# Patient Record
Sex: MALE | Race: WHITE | NOT HISPANIC OR LATINO | Employment: OTHER
[De-identification: names, ages, dates, MRNs, and addresses within clinical notes are randomized per-mention and may not be internally consistent; named-entity substitution may affect disease eponyms.]

---

## 2018-05-14 ENCOUNTER — TRANSCRIBE ORDERS (OUTPATIENT)
Dept: SCHEDULING | Age: 68
End: 2018-05-14

## 2018-05-14 DIAGNOSIS — E11.9 DIABETES MELLITUS WITHOUT COMPLICATION (CMS/HCC): Primary | ICD-10-CM

## 2018-05-16 ENCOUNTER — OFFICE VISIT (OUTPATIENT)
Dept: NUTRITION | Facility: HOSPITAL | Age: 68
End: 2018-05-16
Attending: FAMILY MEDICINE
Payer: MEDICARE

## 2018-05-16 DIAGNOSIS — E11.9 DIABETES MELLITUS WITHOUT COMPLICATION (CMS/HCC): ICD-10-CM

## 2018-05-16 PROCEDURE — G0108 DIAB MANAGE TRN  PER INDIV: HCPCS

## 2018-05-16 RX ORDER — LOSARTAN POTASSIUM 50 MG/1
TABLET ORAL DAILY
COMMUNITY

## 2018-05-16 RX ORDER — METFORMIN HYDROCHLORIDE 500 MG/1
500 TABLET ORAL 2 TIMES DAILY WITH MEALS
COMMUNITY
End: 2023-11-06

## 2018-05-16 RX ORDER — SIMVASTATIN 40 MG/1
40 TABLET, FILM COATED ORAL NIGHTLY
COMMUNITY

## 2018-05-16 NOTE — PATIENT INSTRUCTIONS
Vernon Crawley Jr  5/16/2018       Self Blood Glucose [place]:  Meter: accucheck guide      Target Range:  mg/dl - Before meal and less than 150 mg/dl - 2-hr. after meal    Recommended testing schedule:  Once a day - vary times     1.  Physical Activity:  Time: walk Time & frequency: 2 miles after lunch 4-6 times/week    ** Notify physician if you experience sever shortness of breath, dizziness, lightheadedness or chest pain during exercise.  Call 911 if rest does not relieve symptoms within 5 minutes.    2.  Healthy Eating: Reduce portion sizes, 3 meals, 1-2 snacks, Do not skip meal or carbohydrates, No more than 5 hrs. between meals or snacks and Always include protein or good fat when eating carbohydrates    3.  Goals / Recommendations:  Call physician for prescription for test strips and lancets    4.  Complete:  Diabetes Skills & Management Training Sessions in  Call or email me to schedule!        Angelica Landeros RN

## 2018-05-16 NOTE — PROGRESS NOTES
Diabetes Management Program    Subjective      Patient ID: Mr. Vernon Crawley Jr is a 68 y.o. male who has been referred for diabetes assessment.      Diabetes Medication/Instruction:   Current Outpatient Prescriptions:   •  losartan (COZAAR) 50 mg tablet, Take 50 mg by mouth daily., Disp: , Rfl:   •  metFORMIN (GLUCOPHAGE) 500 mg tablet, Take 500 mg by mouth 2 (two) times a day with meals., Disp: , Rfl:   •  simvastatin (ZOCOR) 40 mg tablet, Take 40 mg by mouth nightly., Disp: , Rfl:       SBGM:  Meter accucheck guide  instructed    Schedule:  7 days per week     1 Tests per day before meals only  Comments: BG in office at 2:45 PM (2 hrs after lunch) was 156 mg/dl.  Encouraged to monitor at different times of day.  Good return demo of meter    Physicial Activity: Discussed benefits of exercise and various options  Comments: agreed to start walking again for 2 miles 4 times/week    Meal Planning: Hx of skipping meals, Instructed basics of carb counting, basic portions, meal spacing, Label reading for carbs and Given suggested distribution for meals and snacks.     Plan: Will call back to schedule classes  Goals     • Follow your diet plan as advised by your nutritionist     • Increase physical activity      • Record your blood sugar as directed          Barriers:  No identified barriers    Plans to overcome barriers: Family involvement and Written materials    Teaching directed to:patient and wife    Maggie Landeros, RN-BC, MSN, CDE

## 2018-05-16 NOTE — LETTER
May 16, 2018     Chula Riggs MD  [address]  [place] [facility] [place] [address]    Patient: Vernon Crawley Jr   YOB: 1950   Date of Visit: 5/16/2018       Dear Dr. Riggs:    Thank you for referring Vernon Crawley Jr to me for evaluation. Below are my notes for this consultation.    If you have questions, please do not hesitate to call me. I look forward to following your patient along with you.         Sincerely,         DIABETES NURSE        CC: No Recipients  Diabetes Management Program    Subjective      Patient ID: Mr. Vernon Crawley Jr is a 68 y.o. male who has been referred for diabetes assessment.      Diabetes Medication/Instruction:   Current Outpatient Prescriptions:   •  losartan (COZAAR) 50 mg tablet, Take 50 mg by mouth daily., Disp: , Rfl:   •  metFORMIN (GLUCOPHAGE) 500 mg tablet, Take 500 mg by mouth 2 (two) times a day with meals., Disp: , Rfl:   •  simvastatin (ZOCOR) 40 mg tablet, Take 40 mg by mouth nightly., Disp: , Rfl:       SBGM:  Meter accucheck guide  instructed    Schedule:  7 days per week     1 Tests per day before meals only  Comments: BG in office at 2:45 PM (2 hrs after lunch) was 156 mg/dl.  Encouraged to monitor at different times of day.  Good return demo of meter    Physicial Activity: Discussed benefits of exercise and various options  Comments: agreed to start walking again for 2 miles 4 times/week    Meal Planning: Hx of skipping meals, Instructed basics of carb counting, basic portions, meal spacing, Label reading for carbs and Given suggested distribution for meals and snacks.     Plan: Will call back to schedule classes  Goals     • Follow your diet plan as advised by your nutritionist     • Increase physical activity      • Record your blood sugar as directed          Barriers:  No identified barriers    Plans to overcome barriers: Family involvement and Written materials    Teaching directed to:patient and wife    Maggie Landeros, RN-BC, MSN,  CDE

## 2018-06-06 ENCOUNTER — OFFICE VISIT (OUTPATIENT)
Dept: NUTRITION | Facility: HOSPITAL | Age: 68
End: 2018-06-06
Attending: FAMILY MEDICINE
Payer: MEDICARE

## 2018-06-06 DIAGNOSIS — Z71.89 ENCOUNTER FOR DIABETES EDUCATION: ICD-10-CM

## 2018-06-06 DIAGNOSIS — E11.9 TYPE 2 DIABETES MELLITUS WITHOUT COMPLICATION, WITHOUT LONG-TERM CURRENT USE OF INSULIN (CMS/HCC): Primary | ICD-10-CM

## 2018-06-06 PROCEDURE — G0109 DIAB MANAGE TRN IND/GROUP: HCPCS

## 2018-06-06 NOTE — PROGRESS NOTES
No group context. This SmartLink only works when in group documentation mode.    Subjective      Patient ID: Vernon Crawley Jr is a 68 y.o. male who has been referred for diabetes assessment.    Mr. Misbah Zurita has attended this group education session.  Class 1 of 3.    Angelica Landeros RN

## 2018-06-06 NOTE — LETTER
June 7, 2018     Chula Riggs MD  [address]  [place] [facility] [place] [address]    Patient: Vernon Crawley Jr   YOB: 1950   Date of Visit: 6/6/2018       Dear Dr. Riggs:    Thank you for referring Vernon Crawley Jr to me for evaluation. Below are my notes for this consultation.    If you have questions, please do not hesitate to call me. I look forward to following your patient along with you.         Sincerely,        [facility] DIABETES EDUCATION        CC: No Recipients  No group context. This SmartLink only works when in group documentation mode.    Subjective      Patient ID: Vernon Crawley Jr is a 68 y.o. male who has been referred for diabetes assessment.    Mr. Misbah Zurita has attended this group education session.  Class 1 of 3.    Angelica Landeros RN

## 2018-06-06 NOTE — LETTER
June 6, 2018     Carol Worley, DO  [address]  [address]  [facility] [address]    Patient: Vernon Crawley Jr   YOB: 1950   Date of Visit: 6/6/2018       Dear Dr. Worley:    Thank you for referring Vernon Crawley Jr to me for evaluation. Below are my notes for this consultation.    If you have questions, please do not hesitate to call me. I look forward to following your patient along with you.         Sincerely,        [facility] DIABETES EDUCATION        CC: No Recipients  No group context. This SmartLink only works when in group documentation mode.    Subjective      Patient ID: Vernon Crawley Jr is a 68 y.o. male who has been referred for diabetes assessment.    Mr. Misbah Zurita has attended this group education session.  Class 1 of 3.    Angelica Landeros RN

## 2018-06-13 ENCOUNTER — OFFICE VISIT (OUTPATIENT)
Dept: NUTRITION | Facility: HOSPITAL | Age: 68
End: 2018-06-13
Attending: FAMILY MEDICINE
Payer: MEDICARE

## 2018-06-13 DIAGNOSIS — Z71.89 ENCOUNTER FOR DIABETES EDUCATION: ICD-10-CM

## 2018-06-13 DIAGNOSIS — E11.9 TYPE 2 DIABETES MELLITUS WITHOUT COMPLICATION, WITHOUT LONG-TERM CURRENT USE OF INSULIN (CMS/HCC): Primary | ICD-10-CM

## 2018-06-13 PROCEDURE — G0109 DIAB MANAGE TRN IND/GROUP: HCPCS

## 2018-06-13 NOTE — PROGRESS NOTES
No group context. This SmartLink only works when in group documentation mode.    Subjective      Patient ID: Vernon Crawley Jr is a 68 y.o. male who has been referred for diabetes assessment.    Mr. Misbah Zurita has attended this group education session.  Class 2 of 3.    Angelica Landeros RN

## 2018-06-13 NOTE — LETTER
June 13, 2018     Chula Riggs MD  [address]  [place] [facility] [place] [address]    Patient: Vernon Crawley Jr   YOB: 1950   Date of Visit: 6/13/2018       Dear Dr. Riggs:    Thank you for referring Vernon Crawley Jr to me for evaluation. Below are my notes for this consultation.    If you have questions, please do not hesitate to call me. I look forward to following your patient along with you.         Sincerely,        [facility] DIABETES EDUCATION        CC: No Recipients  No group context. This SmartLink only works when in group documentation mode.    Subjective      Patient ID: Vernon Crawley Jr is a 68 y.o. male who has been referred for diabetes assessment.    Mr. Misbah Zurita has attended this group education session.  Class 2 of 3.    Angelica Landeros RN

## 2018-06-20 ENCOUNTER — OFFICE VISIT (OUTPATIENT)
Dept: NUTRITION | Facility: HOSPITAL | Age: 68
End: 2018-06-20
Attending: INTERNAL MEDICINE
Payer: MEDICARE

## 2018-06-20 DIAGNOSIS — E11.9 TYPE 2 DIABETES MELLITUS WITHOUT COMPLICATION, WITHOUT LONG-TERM CURRENT USE OF INSULIN (CMS/HCC): Primary | ICD-10-CM

## 2018-06-20 PROCEDURE — 97804 MEDICAL NUTRITION GROUP: CPT | Performed by: DIETITIAN, REGISTERED

## 2018-06-20 PROCEDURE — G0109 DIAB MANAGE TRN IND/GROUP: HCPCS | Performed by: DIETITIAN, REGISTERED

## 2018-06-20 NOTE — PROGRESS NOTES
NUTRITION PROGRESS NOTE      Vernon attended the Nutrition education class of the Diabetes Skills and Management class at [facility] on 6/20/2018.  Vernon was given instructions to set a follow-up individual appointment for nutrition within the next 4-8 weeks.

## 2018-06-20 NOTE — LETTER
June 20, 2018     Carol Worley,   [address]  [address]  [facility] [address]    Patient: Vernon Crawlye Jr   YOB: 1950   Date of Visit: 6/20/2018       Dear Dr. Worley:    Thank you for referring Vernon Crawley Jr to me for evaluation. Below are my notes for this consultation.    If you have questions, please do not hesitate to call me. I look forward to following your patient along with you.         Sincerely,        [facility] DIABETES EDUCATION        CC: No Recipients  NUTRITION PROGRESS NOTE      Vernon attended the Nutrition education class of the Diabetes Skills and Management class at [facility] on 6/20/2018.  Vernon was given instructions to set a follow-up individual appointment for nutrition within the next 4-8 weeks.

## 2018-07-04 ENCOUNTER — HOSPITAL ENCOUNTER (INPATIENT)
Facility: HOSPITAL | Age: 68
LOS: 2 days | Discharge: HOME | DRG: 917 | End: 2018-07-06
Attending: EMERGENCY MEDICINE | Admitting: HOSPITALIST
Payer: MEDICARE

## 2018-07-04 ENCOUNTER — APPOINTMENT (EMERGENCY)
Dept: RADIOLOGY | Facility: HOSPITAL | Age: 68
DRG: 917 | End: 2018-07-04
Attending: EMERGENCY MEDICINE
Payer: MEDICARE

## 2018-07-04 ENCOUNTER — APPOINTMENT (INPATIENT)
Dept: RADIOLOGY | Facility: HOSPITAL | Age: 68
DRG: 917 | End: 2018-07-04
Attending: INTERNAL MEDICINE
Payer: MEDICARE

## 2018-07-04 DIAGNOSIS — J96.00 ACUTE RESPIRATORY FAILURE, UNSPECIFIED WHETHER WITH HYPOXIA OR HYPERCAPNIA: ICD-10-CM

## 2018-07-04 DIAGNOSIS — T78.40XA ALLERGIC REACTION, INITIAL ENCOUNTER: Primary | ICD-10-CM

## 2018-07-04 PROBLEM — T63.441A ANAPHYLACTIC REACTION TO BEE STING: Status: ACTIVE | Noted: 2018-07-04

## 2018-07-04 PROBLEM — N28.9 ACUTE RENAL INSUFFICIENCY: Status: ACTIVE | Noted: 2018-07-04

## 2018-07-04 PROBLEM — E78.49 OTHER HYPERLIPIDEMIA: Status: ACTIVE | Noted: 2018-07-04

## 2018-07-04 PROBLEM — I10 ESSENTIAL HYPERTENSION: Status: ACTIVE | Noted: 2018-07-04

## 2018-07-04 PROBLEM — T78.2XXA ANAPHYLACTIC REACTION TO BEE STING: Status: ACTIVE | Noted: 2018-07-04

## 2018-07-04 LAB
ALBUMIN SERPL-MCNC: 3.7 G/DL (ref 3.4–5)
ALP SERPL-CCNC: 57 IU/L (ref 35–126)
ALT SERPL-CCNC: 15 IU/L (ref 16–63)
ANION GAP SERPL CALC-SCNC: 8 MEQ/L (ref 3–15)
APTT PPP: 24 SEC. (ref 23–35)
AST SERPL-CCNC: 23 IU/L (ref 15–41)
BACTERIA URNS QL MICRO: 1 /UL
BASE EXCESS BLDA CALC-SCNC: -3.9 MMOL/L
BASE EXCESS BLDA CALC-SCNC: -8.5 MMOL/L
BASOPHILS # BLD: 0 K/UL (ref 0.01–0.1)
BASOPHILS NFR BLD: 0 %
BILIRUB SERPL-MCNC: 0.7 MG/DL (ref 0.3–1.2)
BILIRUB UR QL STRIP.AUTO: NEGATIVE MG/DL
BUN SERPL-MCNC: 17 MG/DL (ref 8–20)
CA-I BLD-SCNC: 1.12 MMOL/L (ref 1.15–1.27)
CA-I BLD-SCNC: 1.16 MMOL/L (ref 1.15–1.27)
CALCIUM SERPL-MCNC: 8.5 MG/DL (ref 8.9–10.3)
CHLORIDE SERPL-SCNC: 109 MMOL/L (ref 98–109)
CK SERPL-CCNC: 77 IU/L (ref 16–300)
CLARITY UR REFRACT.AUTO: ABNORMAL
CO2 BLDA-SCNC: 20 MMOL/L (ref 22–32)
CO2 BLDA-SCNC: 21 MMOL/L (ref 22–32)
CO2 SERPL-SCNC: 22 MMOL/L (ref 22–32)
COLOR UR AUTO: YELLOW
CREAT SERPL-MCNC: 1.4 MG/DL (ref 0.8–1.3)
DIFFERENTIAL METHOD BLD: ABNORMAL
EOSINOPHIL # BLD: 0.1 K/UL (ref 0.04–0.54)
EOSINOPHIL NFR BLD: 1 %
ERYTHROCYTE [DISTWIDTH] IN BLOOD BY AUTOMATED COUNT: 12.7 % (ref 11.6–14.4)
GFR SERPL CREATININE-BSD FRML MDRD: 50.7 ML/MIN/1.73M*2
GIANT PLATELETS BLD QL SMEAR: ABNORMAL
GLUCOSE BLD-MCNC: 157 MG/DL (ref 70–99)
GLUCOSE BLD-MCNC: 176 MG/DL (ref 70–99)
GLUCOSE BLDA-MCNC: 152 MG/DL (ref 65–95)
GLUCOSE BLDA-MCNC: 228 MG/DL (ref 65–95)
GLUCOSE SERPL-MCNC: 231 MG/DL (ref 70–99)
GLUCOSE UR STRIP.AUTO-MCNC: ABNORMAL MG/DL
HCO3 BLDA-SCNC: 18 MMOL/L (ref 21–28)
HCO3 BLDA-SCNC: 20 MMOL/L (ref 21–28)
HCT VFR BLDA CALC: 44 % (ref 36–48)
HCT VFR BLDA CALC: 47 % (ref 36–48)
HCT VFR BLDCO AUTO: 43.1 % (ref 40–51)
HGB BLD-MCNC: 14.5 G/DL (ref 13.7–17.5)
HGB UR QL STRIP.AUTO: 3
HYALINE CASTS #/AREA URNS LPF: ABNORMAL /LPF
INR PPP: 1.2 INR
KETONES UR STRIP.AUTO-MCNC: NEGATIVE MG/DL
LACTATE BLDA-SCNC: 1.7 MMOL/L (ref 0.4–1.6)
LACTATE BLDA-SCNC: 3.4 MMOL/L (ref 0.4–1.6)
LACTATE SERPL-SCNC: 4.4 MMOL/L (ref 0.4–2)
LEUKOCYTE ESTERASE UR QL STRIP.AUTO: ABNORMAL
LYMPHOCYTES # BLD: 4.87 K/UL (ref 1.2–3.5)
LYMPHOCYTES NFR BLD: 49 %
MAGNESIUM SERPL-MCNC: 2 MG/DL (ref 1.8–2.5)
MCH RBC QN AUTO: 32.4 PG (ref 28–33.2)
MCHC RBC AUTO-ENTMCNC: 33.6 G/DL (ref 32.2–36.5)
MCV RBC AUTO: 96.4 FL (ref 83–98)
MONOCYTES # BLD: 0.5 K/UL (ref 0.3–1)
MONOCYTES NFR BLD: 5 %
MRSA DNA SPEC QL NAA+PROBE: NEGATIVE
NEUTS BAND # BLD: 4.27 K/UL (ref 1.7–7)
NEUTS SEG NFR BLD: 43 %
NITRITE UR QL STRIP.AUTO: NEGATIVE
OVALOCYTES BLD QL SMEAR: ABNORMAL
PCO2 BLDA: 31 MMHG (ref 35–48)
PCO2 BLDA: 42 MMHG (ref 35–48)
PDW BLD AUTO: 9.5 FL (ref 9.4–12.4)
PH BLDA: 7.25 PH (ref 7.35–7.45)
PH BLDA: 7.41 PH (ref 7.35–7.45)
PH UR STRIP.AUTO: 6 [PH]
PLATELET # BLD AUTO: 359 K/UL (ref 150–350)
PLATELET # BLD EST: ABNORMAL 10*3/UL
PO2 BLDA: 113 MMHG (ref 60–70)
PO2 BLDA: 231 MMHG (ref 60–70)
POCT PATIENT TEMPERATURE: 98.6 F (ref 97–99)
POCT PATIENT TEMPERATURE: 98.6 F (ref 97–99)
POLYCHROMASIA BLD QL SMEAR: ABNORMAL
POTASSIUM BLDA-SCNC: 3 MEQ/L (ref 3.4–4.5)
POTASSIUM BLDA-SCNC: 3.8 MEQ/L (ref 3.4–4.5)
POTASSIUM SERPL-SCNC: 3.4 MMOL/L (ref 3.6–5.1)
PROT SERPL-MCNC: 6.3 G/DL (ref 6–8.2)
PROT UR QL STRIP.AUTO: 3
PROTHROMBIN TIME: 14.9 SEC. (ref 12.2–14.5)
RBC # BLD AUTO: 4.47 M/UL (ref 4.5–5.8)
RBC #/AREA URNS HPF: ABNORMAL /HPF
SAO2 % BLDA: 100 % (ref 93–98)
SAO2 % BLDA: 98 % (ref 93–98)
SODIUM BLDA-SCNC: 136 MEQ/L (ref 136–145)
SODIUM BLDA-SCNC: 141 MEQ/L (ref 136–145)
SODIUM SERPL-SCNC: 139 MMOL/L (ref 136–144)
SP GR UR REFRACT.AUTO: 1.02
SQUAMOUS URNS QL MICRO: 3 /HPF
TROPONIN I SERPL-MCNC: <0.02 NG/ML
UROBILINOGEN UR STRIP-ACNC: 0.2 EU/DL
VARIANT LYMPHS NFR BLD: 2 %
WBC # BLD AUTO: 9.94 K/UL (ref 3.8–10.5)
WBC #/AREA URNS HPF: ABNORMAL /HPF

## 2018-07-04 PROCEDURE — 85025 COMPLETE CBC W/AUTO DIFF WBC: CPT | Performed by: EMERGENCY MEDICINE

## 2018-07-04 PROCEDURE — 71045 X-RAY EXAM CHEST 1 VIEW: CPT

## 2018-07-04 PROCEDURE — 85610 PROTHROMBIN TIME: CPT | Performed by: EMERGENCY MEDICINE

## 2018-07-04 PROCEDURE — 25800000 HC PHARMACY IV SOLUTIONS: Performed by: EMERGENCY MEDICINE

## 2018-07-04 PROCEDURE — 85730 THROMBOPLASTIN TIME PARTIAL: CPT | Performed by: EMERGENCY MEDICINE

## 2018-07-04 PROCEDURE — 93005 ELECTROCARDIOGRAM TRACING: CPT | Performed by: EMERGENCY MEDICINE

## 2018-07-04 PROCEDURE — 25000000 HC PHARMACY GENERAL: Performed by: INTERNAL MEDICINE

## 2018-07-04 PROCEDURE — 63600000 HC DRUGS/DETAIL CODE: Performed by: HOSPITALIST

## 2018-07-04 PROCEDURE — 99291 CRITICAL CARE FIRST HOUR: CPT | Mod: 25

## 2018-07-04 PROCEDURE — 63600000 HC DRUGS/DETAIL CODE: Performed by: EMERGENCY MEDICINE

## 2018-07-04 PROCEDURE — 96375 TX/PRO/DX INJ NEW DRUG ADDON: CPT

## 2018-07-04 PROCEDURE — 87086 URINE CULTURE/COLONY COUNT: CPT | Performed by: EMERGENCY MEDICINE

## 2018-07-04 PROCEDURE — 96374 THER/PROPH/DIAG INJ IV PUSH: CPT

## 2018-07-04 PROCEDURE — 25000000 HC PHARMACY GENERAL

## 2018-07-04 PROCEDURE — 36600 WITHDRAWAL OF ARTERIAL BLOOD: CPT

## 2018-07-04 PROCEDURE — 31500 INSERT EMERGENCY AIRWAY: CPT | Performed by: EMERGENCY MEDICINE

## 2018-07-04 PROCEDURE — 84484 ASSAY OF TROPONIN QUANT: CPT | Performed by: EMERGENCY MEDICINE

## 2018-07-04 PROCEDURE — 80053 COMPREHEN METABOLIC PANEL: CPT | Performed by: EMERGENCY MEDICINE

## 2018-07-04 PROCEDURE — 25800000 HC PHARMACY IV SOLUTIONS: Performed by: INTERNAL MEDICINE

## 2018-07-04 PROCEDURE — 81001 URINALYSIS AUTO W/SCOPE: CPT | Performed by: EMERGENCY MEDICINE

## 2018-07-04 PROCEDURE — 94002 VENT MGMT INPAT INIT DAY: CPT

## 2018-07-04 PROCEDURE — 83605 ASSAY OF LACTIC ACID: CPT | Performed by: EMERGENCY MEDICINE

## 2018-07-04 PROCEDURE — 63600000 HC DRUGS/DETAIL CODE

## 2018-07-04 PROCEDURE — 82550 ASSAY OF CK (CPK): CPT | Performed by: EMERGENCY MEDICINE

## 2018-07-04 PROCEDURE — 87641 MR-STAPH DNA AMP PROBE: CPT | Performed by: HOSPITALIST

## 2018-07-04 PROCEDURE — 83735 ASSAY OF MAGNESIUM: CPT | Performed by: EMERGENCY MEDICINE

## 2018-07-04 PROCEDURE — 63600000 HC DRUGS/DETAIL CODE: Mod: JW | Performed by: INTERNAL MEDICINE

## 2018-07-04 PROCEDURE — 99285 EMERGENCY DEPT VISIT HI MDM: CPT | Mod: 25

## 2018-07-04 PROCEDURE — 71045 X-RAY EXAM CHEST 1 VIEW: CPT | Mod: 76

## 2018-07-04 PROCEDURE — 99291 CRITICAL CARE FIRST HOUR: CPT | Performed by: HOSPITALIST

## 2018-07-04 PROCEDURE — 20000000 HC ROOM AND CARE ICU

## 2018-07-04 PROCEDURE — 25000000 HC PHARMACY GENERAL: Performed by: EMERGENCY MEDICINE

## 2018-07-04 PROCEDURE — 36415 COLL VENOUS BLD VENIPUNCTURE: CPT | Performed by: EMERGENCY MEDICINE

## 2018-07-04 PROCEDURE — 63600000 HC DRUGS/DETAIL CODE: Performed by: INTERNAL MEDICINE

## 2018-07-04 RX ORDER — ACETAMINOPHEN 500 MG
5000 TABLET ORAL DAILY
COMMUNITY
End: 2023-11-06

## 2018-07-04 RX ORDER — MIDAZOLAM HYDROCHLORIDE 2 MG/2ML
INJECTION, SOLUTION INTRAMUSCULAR; INTRAVENOUS
Status: COMPLETED
Start: 2018-07-04 | End: 2018-07-04

## 2018-07-04 RX ORDER — FAMOTIDINE 10 MG/ML
20 INJECTION INTRAVENOUS EVERY 12 HOURS
Status: DISCONTINUED | OUTPATIENT
Start: 2018-07-04 | End: 2018-07-06 | Stop reason: HOSPADM

## 2018-07-04 RX ORDER — DIPHENHYDRAMINE HYDROCHLORIDE 50 MG/ML
INJECTION INTRAMUSCULAR; INTRAVENOUS
Status: COMPLETED
Start: 2018-07-04 | End: 2018-07-04

## 2018-07-04 RX ORDER — MIDAZOLAM HYDROCHLORIDE 2 MG/2ML
5 INJECTION, SOLUTION INTRAMUSCULAR; INTRAVENOUS ONCE
Status: COMPLETED | OUTPATIENT
Start: 2018-07-04 | End: 2018-07-04

## 2018-07-04 RX ORDER — DEXTROSE 40 %
15-30 GEL (GRAM) ORAL AS NEEDED
Status: DISCONTINUED | OUTPATIENT
Start: 2018-07-04 | End: 2018-07-06 | Stop reason: HOSPADM

## 2018-07-04 RX ORDER — PROPOFOL 10 MG/ML
INJECTION, EMULSION INTRAVENOUS
Status: COMPLETED
Start: 2018-07-04 | End: 2018-07-04

## 2018-07-04 RX ORDER — DEXTROSE 50 % IN WATER (D50W) INTRAVENOUS SYRINGE
25 AS NEEDED
Status: DISCONTINUED | OUTPATIENT
Start: 2018-07-04 | End: 2018-07-06 | Stop reason: HOSPADM

## 2018-07-04 RX ORDER — ENOXAPARIN SODIUM 100 MG/ML
40 INJECTION SUBCUTANEOUS
Status: DISCONTINUED | OUTPATIENT
Start: 2018-07-04 | End: 2018-07-06 | Stop reason: HOSPADM

## 2018-07-04 RX ORDER — PROPOFOL 10 MG/ML
10-80 INJECTION, EMULSION INTRAVENOUS
Status: DISCONTINUED | OUTPATIENT
Start: 2018-07-04 | End: 2018-07-05

## 2018-07-04 RX ORDER — MIDAZOLAM HYDROCHLORIDE 2 MG/2ML
2 INJECTION, SOLUTION INTRAMUSCULAR; INTRAVENOUS AS NEEDED
Status: DISCONTINUED | OUTPATIENT
Start: 2018-07-04 | End: 2018-07-05

## 2018-07-04 RX ORDER — MIDAZOLAM HYDROCHLORIDE 2 MG/2ML
5 INJECTION, SOLUTION INTRAMUSCULAR; INTRAVENOUS ONCE
Status: DISCONTINUED | OUTPATIENT
Start: 2018-07-04 | End: 2018-07-04

## 2018-07-04 RX ORDER — FAMOTIDINE 10 MG/ML
INJECTION INTRAVENOUS
Status: COMPLETED
Start: 2018-07-04 | End: 2018-07-04

## 2018-07-04 RX ORDER — POTASSIUM CHLORIDE 14.9 MG/ML
20 INJECTION INTRAVENOUS ONCE
Status: COMPLETED | OUTPATIENT
Start: 2018-07-04 | End: 2018-07-04

## 2018-07-04 RX ORDER — INSULIN ASPART 100 [IU]/ML
6-10 INJECTION, SOLUTION INTRAVENOUS; SUBCUTANEOUS EVERY 6 HOURS
Status: DISCONTINUED | OUTPATIENT
Start: 2018-07-04 | End: 2018-07-05

## 2018-07-04 RX ORDER — ETOMIDATE 2 MG/ML
INJECTION INTRAVENOUS
Status: COMPLETED | OUTPATIENT
Start: 2018-07-04 | End: 2018-07-04

## 2018-07-04 RX ORDER — IBUPROFEN 200 MG
16-32 TABLET ORAL AS NEEDED
Status: DISCONTINUED | OUTPATIENT
Start: 2018-07-04 | End: 2018-07-06 | Stop reason: HOSPADM

## 2018-07-04 RX ORDER — SODIUM CHLORIDE 9 MG/ML
INJECTION, SOLUTION INTRAVENOUS CONTINUOUS
Status: DISCONTINUED | OUTPATIENT
Start: 2018-07-04 | End: 2018-07-05

## 2018-07-04 RX ADMIN — METHYLPREDNISOLONE SODIUM SUCCINATE 60 MG: 125 INJECTION, POWDER, FOR SOLUTION INTRAMUSCULAR; INTRAVENOUS at 14:47

## 2018-07-04 RX ADMIN — PROPOFOL 50 MCG/KG/MIN: 10 INJECTION, EMULSION INTRAVENOUS at 14:12

## 2018-07-04 RX ADMIN — MIDAZOLAM HYDROCHLORIDE 5 MG: 2 INJECTION, SOLUTION INTRAMUSCULAR; INTRAVENOUS at 12:23

## 2018-07-04 RX ADMIN — ENOXAPARIN SODIUM 40 MG: 40 INJECTION SUBCUTANEOUS at 17:45

## 2018-07-04 RX ADMIN — MIDAZOLAM 5 MG: 1 INJECTION INTRAMUSCULAR; INTRAVENOUS at 12:23

## 2018-07-04 RX ADMIN — SODIUM CHLORIDE 1000 ML: 900 INJECTION, SOLUTION INTRAVENOUS at 12:34

## 2018-07-04 RX ADMIN — DIPHENHYDRAMINE HYDROCHLORIDE: 50 INJECTION, SOLUTION INTRAMUSCULAR; INTRAVENOUS at 11:41

## 2018-07-04 RX ADMIN — PROPOFOL 50 MCG/KG/MIN: 10 INJECTION, EMULSION INTRAVENOUS at 16:33

## 2018-07-04 RX ADMIN — ETOMIDATE 20 MG: 2 INJECTION INTRAVENOUS at 11:39

## 2018-07-04 RX ADMIN — SUCCINYLCHOLINE CHLORIDE 200 MG: 20 INJECTION, SOLUTION INTRAMUSCULAR; INTRAVENOUS at 11:39

## 2018-07-04 RX ADMIN — PROPOFOL 1000 MG: 10 INJECTION, EMULSION INTRAVENOUS at 12:34

## 2018-07-04 RX ADMIN — SODIUM CHLORIDE: 9 INJECTION, SOLUTION INTRAVENOUS at 14:48

## 2018-07-04 RX ADMIN — PROPOFOL 50 MCG/KG/MIN: 10 INJECTION, EMULSION INTRAVENOUS at 13:48

## 2018-07-04 RX ADMIN — MIDAZOLAM HYDROCHLORIDE 2 MG: 1 INJECTION, SOLUTION INTRAMUSCULAR; INTRAVENOUS at 13:45

## 2018-07-04 RX ADMIN — SODIUM CHLORIDE: 9 INJECTION, SOLUTION INTRAVENOUS at 18:22

## 2018-07-04 RX ADMIN — FAMOTIDINE: 10 INJECTION INTRAVENOUS at 11:43

## 2018-07-04 RX ADMIN — POTASSIUM CHLORIDE 20 MEQ: 200 INJECTION, SOLUTION INTRAVENOUS at 21:33

## 2018-07-04 RX ADMIN — SODIUM CHLORIDE 1000 ML: 900 INJECTION, SOLUTION INTRAVENOUS at 11:44

## 2018-07-04 RX ADMIN — METHYLPREDNISOLONE SODIUM SUCCINATE: 125 INJECTION, POWDER, FOR SOLUTION INTRAMUSCULAR; INTRAVENOUS at 11:41

## 2018-07-04 RX ADMIN — MIDAZOLAM 5 MG: 1 INJECTION INTRAMUSCULAR; INTRAVENOUS at 12:33

## 2018-07-04 RX ADMIN — FAMOTIDINE 20 MG: 10 INJECTION, SOLUTION INTRAVENOUS at 20:21

## 2018-07-04 ASSESSMENT — ENCOUNTER SYMPTOMS
DIFFICULTY BREATHING: 1
TROUBLE SWALLOWING: 1
ALLERGIC REACTION: 1
SHORTNESS OF BREATH: 1

## 2018-07-04 ASSESSMENT — COGNITIVE AND FUNCTIONAL STATUS - GENERAL
MOVING TO AND FROM BED TO CHAIR: 4 - NONE
TOILETING: 4 - NONE
STANDING UP FROM CHAIR USING ARMS: 4 - NONE
DRESSING REGULAR LOWER BODY CLOTHING: 4 - NONE
DRESSING REGULAR UPPER BODY CLOTHING: 4 - NONE
CLIMB 3 TO 5 STEPS WITH RAILING: 4 - NONE
HELP NEEDED FOR BATHING: 4 - NONE
HELP NEEDED FOR PERSONAL GROOMING: 4 - NONE
EATING MEALS: 4 - NONE
WALKING IN HOSPITAL ROOM: 4 - NONE

## 2018-07-04 NOTE — ASSESSMENT & PLAN NOTE
Recently diagnosed, mild DM2  On metformin 500mg BID, losartan 50mg Q24    - hold metformin, losartan  - ISS/accuchecks  - expect high sugars in setting of steroids

## 2018-07-04 NOTE — PLAN OF CARE
Problem: Ventilation, Mechanical Invasive (Adult)  Goal: Signs and Symptoms of Listed Potential Problems Will be Absent, Minimized or Managed (Ventilation, Mechanical Invasive)  Outcome: Ongoing (interventions implemented as appropriate)      Problem: Restraint, Nonbehavioral (Nonviolent)  Goal: Rationale and Justification  Outcome: Ongoing (interventions implemented as appropriate)    Goal: Nonbehavioral (Nonviolent) Restraint: Absence of Injury/Harm  Outcome: Ongoing (interventions implemented as appropriate)    Goal: Nonbehavioral (Nonviolent) Restraint: Achievement of Discontinuation Criteria  Outcome: Ongoing (interventions implemented as appropriate)    Goal: Nonbehavioral (Nonviolent) Restraint: Preservation of Dignity/Well-being  Outcome: Ongoing (interventions implemented as appropriate)      Problem: Patient Care Overview  Goal: Plan of Care Review  Outcome: Ongoing (interventions implemented as appropriate)

## 2018-07-04 NOTE — ASSESSMENT & PLAN NOTE
No previously noted hx of renal disease; no labs in our records  Suspect Cr bump to 1.4 related to anaphylaxis    - cont aggressive IV hydration  - Cr improved to 1.1  - recheck BMP in AM

## 2018-07-04 NOTE — ED PROVIDER NOTES
HPI     Chief Complaint   Patient presents with   • Allergic Reaction     68 y.o. male presents to the ED for evaluation of allergic reaction PTA. Pt was stung by a bee 15 minutes ago and he developed a severe allergic reaction and subsequently became unresponsive and started drooling. Pt's wife gave pt 1 epipen in the car en route to the hospital.    HPI and ROS are limited because he is unresponsive.      History provided by:  Spouse  History limited by:  Patient unresponsive   used: No    Allergic Reaction   Presenting symptoms: difficulty breathing, difficulty swallowing and swelling    Severity:  Severe  Duration:  15 minutes  Prior allergic episodes:  Insect allergies  Context: insect bite/sting    Ineffective treatments:  Epinephrine       Patient History     No past medical history on file.    No past surgical history on file.    No family history on file.    Social History   Substance Use Topics   • Smoking status: Not on file   • Smokeless tobacco: Not on file   • Alcohol use Not on file       Systems Reviewed from Nursing Triage:          Review of Systems     Review of Systems   Unable to perform ROS: Patient unresponsive   HENT: Positive for drooling and trouble swallowing.    Respiratory: Positive for shortness of breath.    Neurological: Positive for syncope.        Physical Exam     ED Triage Vitals   Temp Heart Rate Resp BP SpO2   -- 07/04/18 1136 07/04/18 1136 07/04/18 1136 07/04/18 1145    (!) 128 (!) 30 (!) 149/64 (!) 89 %      Temp src Heart Rate Source Patient Position BP Location FiO2 (%) (Set)   -- -- 07/04/18 1136 07/04/18 1136 --     Lying Right upper arm                      Patient Vitals for the past 24 hrs:   BP Pulse Resp SpO2   07/04/18 1145 - (!) 130 - (!) 89 %   07/04/18 1136 (!) 149/64 (!) 128 (!) 30 -           Physical Exam   Constitutional: He appears distressed.   HENT:   Head: Atraumatic.   Mouth/Throat: Uvula is midline.   Eyes: Lids are normal.   Neck: No  JVD present.   Cardiovascular: Regular rhythm.  Tachycardia present.    Pulmonary/Chest: Tachypnea noted. He is in respiratory distress.   Abdominal: Soft. There is no tenderness.   Musculoskeletal: He exhibits no edema, tenderness or deformity.   Neurological: He is unresponsive. GCS eye subscore is 1. GCS verbal subscore is 1. GCS motor subscore is 6.   Skin: Skin is warm.   diaphoretic   Nursing note and vitals reviewed.           Intubation  Date/Time: 7/4/2018 12:34 PM  Performed by: BILL VALENTIN  Authorized by: BILL VALENTIN     Consent:     Consent obtained:  Emergent situation    Risks discussed:  Brain injury    Alternatives discussed:  No treatment  Pre-procedure details:     Patient status:  Altered mental status    Mallampati score:  III    Pretreatment medications:  None    Paralytics:  Succinylcholine  Procedure details:     Preoxygenation:  Bag valve mask    CPR in progress: no      Intubation method:  Oral    Oral intubation technique:  Direct    Laryngoscope blade:  Mac 3    Tube size (mm):  7.5    Tube type:  Cuffed    Number of attempts:  2    Ventilation between attempts: yes      Cricoid pressure: yes      Tube visualized through cords: yes    Placement assessment:     ETT to lip:  26    Tube secured with:  ETT silva    Breath sounds:  Equal    Placement verification: chest rise, CXR verification and ETCO2 detector      CXR findings:  ETT in proper place  Post-procedure details:     Patient tolerance of procedure:  Tolerated well, no immediate complications  Comments:      During intubation noted market edema and swelling of airway and reason for respiratory distress.  Critical Care  Performed by: BILL VALENTIN  Authorized by: BILL VALENTIN     Critical care provider statement:     Critical care time (minutes):  38    Critical care was necessary to treat or prevent imminent or life-threatening deterioration of the following conditions:  Circulatory failure and respiratory failure     Critical care was time spent personally by me on the following activities:  Blood draw for specimens, development of treatment plan with patient or surrogate, discussions with consultants, evaluation of patient's response to treatment, examination of patient, gastric intubation, obtaining history from patient or surrogate, ordering and performing treatments and interventions, ordering and review of laboratory studies, ordering and review of radiographic studies, pulse oximetry, re-evaluation of patient's condition and review of old charts        ED Course & MDM     Labs Reviewed - No data to display    No orders to display           MDM  Number of Diagnoses or Management Options  Diagnosis management comments: Patient presented in acute respiratory distress secondary to airway edema secondary to bee sting.  Patient received 2 epidural 2 doses of epinephrine 1 via wife prior to arrival and one in the emergency department with no significant response.  Patient treated with usual medications Pepcid and Benadryl steroids in emergency department.  Patient was intubated for airway protection.  Noted significant edema of larynx during intubation.  IV sedation ordered in the emergency department patient is now awake very combative difficult to cooperate and sedation has become maxed out and will continue to monitor and sedate as appropriate.  Case discussed with Dr. Weems from ICU.  Patient admitted to intensive care unit for monitoring secondary to laryngeal edema from allergic reaction.       Amount and/or Complexity of Data Reviewed  Clinical lab tests: reviewed  Tests in the radiology section of CPT®: reviewed  Independent visualization of images, tracings, or specimens: yes             ED Course          Clinical Impressions as of Jul 04 1325   Allergic reaction, initial encounter   Acute respiratory failure, unspecified whether with hypoxia or hypercapnia (CMS/HCC) ([facility])       Scribe Attestation  By signing my name  below, I, Erlinda Rivas, attest that this documentation has been prepared under the direction and in the presence of BILL Che MD.  7/4/2018 11:43 AM    Provider Attestation  I, BILL Che, personally performed the services described in this documentation, as documented by the scribe in my presence, and it is both accurate and complete.  7/4/2018 12:01 PM         Erlinda Rivas  07/04/18 1159       BILL Che MD  07/04/18 1258       BILL Che MD  07/04/18 1325

## 2018-07-04 NOTE — CONSULTS
Critical Care Consult Note      Reason For Consult: ICU Management; Respiratory failure/Anaphylaxis    LOS: 0    HPI: This is a 69 y/o WM w/ a PMHx as outlined below who presented to the ER today with an anaphylactic reaction.  The history that follows is taken from the patient's wife as he is intubated and sedated.  Mr. Crawley had an anaphylactic reaction back in 2010 after being stung by multiple bees.  He is followed by Dr. Ele Hernandez (allergy & immunology) at [place].  He has been getting allergy shots targeted at bee and wasp venom.  In the years between, there have been stings noted once every other year though those reactions have been milder.    Mr. Crawley was reportedly stung by a wasp 4 days ago (6/30) but did not have a significant reaction.  He actually came to our ER but left before being seen due to the lack of reaction.  Today, while outside, he was stung by a bee.  His wife was bringing him to the ER when be began to become unresponsive.  She pulled over and gave an IM Epi injection. By the time she arrived (within 15 mins), he was unresponsive and unable to get out of the car.  He was placed on a stretcher and intubated for airway protection.  Findings of a significantly swollen upper airway reported.  He was given 2 additional IM Epi injections, Solumedrol, Benadryl, and Pepcid.  Nursing notes significant difficulty with sedation.  Requiring Propofol and Versed.    Patient seen and examined.  Sedated on the vent.  Appears comfortable.  Not manifesting signs of shock at present.  Wife at the bedside.    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  Allergic to bee & wasp venom    Medications:  Current Facility-Administered Medications    Medication Dose Route Frequency Provider Last Rate Last Dose   • sodium chloride 0.9 % bolus 1,000 mL  1,000 mL intravenous Once R Yon Che MD 1,000 mL/hr at 07/04/18 1234 1,000 mL at 07/04/18 1234     Current Outpatient Prescriptions   Medication Sig Dispense Refill   • losartan (COZAAR) 50 mg tablet Take 50 mg by mouth daily.     • metFORMIN (GLUCOPHAGE) 500 mg tablet Take 500 mg by mouth 2 (two) times a day with meals.     • simvastatin (ZOCOR) 40 mg tablet Take 40 mg by mouth nightly.          Vasoactive Agents:  None     Social History:  Social History     Social History   • Marital status:      Spouse name: N/A   • Number of children: N/A   • Years of education: N/A     Social History Main Topics   • Smoking status: Former smoker.  Quit many years ago   • Smokeless tobacco:    • Alcohol use No significant alcohol use per spouse   • Drug use: Denies   • Sexual activity:      Social History Narrative   • .  Lives at home with his wife. Independent of ADLs and ambulation.  Works as a .       Family History:  -Reviewed and non-contributory to current presentation    Review of Systems:  Review of Systems   Unable to perform ROS: Intubated (Unable to participate in a ROS)       Objective     Input/Ouput in Last 24 hours:  No intake or output data in the 24 hours ending 07/04/18 1248    Vital Signs for the last 24 hours:  /70   Pulse 94   Resp (!) 28   Ht 1.829 m (6')   Wt 118 kg (260 lb 2.3 oz)   SpO2 99%   BMI 35.28 kg/m²     Oxygen:  Oxygen Therapy: Supplemental oxygen    Mode of Ventilation:  Mechanical Ventilation AC 14/550/80/5    Lines:  Castillo catheter (7/4)  OGT (7/4)  Peripheral IV x2    Physical Exam:  Physical Exam   Constitutional: He appears well-developed and well-nourished.   HENT:   Head: Normocephalic and atraumatic.   #7.5 ETT.  OGT present   Eyes: No scleral icterus.   Neck: No tracheal deviation present.   Cardiovascular: Normal rate, regular  rhythm and normal heart sounds.    Pulmonary/Chest: Effort normal and breath sounds normal. He has no wheezes.   No findings of bronchospasm.  Assisted respirations on vent.  Vent pressures not elevated   Abdominal: Soft. Bowel sounds are normal.   Obese   Genitourinary:   Genitourinary Comments: Castillo catheter in place draining pale, yellow urine   Musculoskeletal: He exhibits no edema.   Neurological:   Sedated on vent   Skin: Skin is warm and dry. No rash noted.   No evidence of hives or other skin manifestations.  Site of sting not clearly identified   Nursing note and vitals reviewed.      Labs:    Results from last 7 days  Lab Units 07/04/18  1235   PH ART pH 7.25*   PCO2 ART mmHg 42   PO2 ART mmHg 231*   HCO3 ART mmol/L 18*   O2 SAT ART % 100*   BASE EXC ART mmol/L -8.5       Lab Results   Component Value Date    WBC 9.94 07/04/2018    HGB 14.5 07/04/2018    HCT 43.1 07/04/2018     (H) 07/04/2018    ALT 15 (L) 07/04/2018    AST 23 07/04/2018     07/04/2018    K 3.4 (L) 07/04/2018     07/04/2018    CREATININE 1.4 (H) 07/04/2018    BUN 17 07/04/2018    CO2 22 07/04/2018    INR 1.2 07/04/2018       Current Weight (gm): 013552  % Ideal Body Weight: 143.78  Ideal Body Weight (IBW) (kg): 82.07      Imaging/Radiology:      Chest x-ray (7/4): Endotracheal tube with its tip in satisfactory position. Mild patchy airspace disease at the right lung base.    EKG/Telemetry:  sinus rhythm at 74 bpm        Medical ICU IMPRESSION AND PLAN :    Ventilator dependent respiratory failure  -Intubated 7/4 for upper airway swelling secondary to anaphylaxis.  No formal h/o lung disease  -Current vent settings of AC 14/550/80/5  --Based on ABG, change vent settings to 20/550/40/5  --Repeat ABG in 2 hours time -> 3pm  -Daily chest x-ray while intubated  --Repeat chest film upon arrival to ICU to verify placement of OGT  -Pepcid  -Propofol gtt for sedation.  Titrate for a SAS of 3-4  --Monitor Triglyceride levels q72  hours  --Versed 2mg IVP PRN SAS 5-7/Vent Dyssnchrony  -No plans for SBT today.  Will begin weaning efforts in AM on 7/5.  Will need to assess for cuff leak prior to extubating    Anaphylaxis  -Known h/o anaphylaxis w/ bee and wasp venom.  Getting allergy shots routinely.  Followed by Dr. Hernandez at [place]  -s/p IM EPI x3 total.  Currently without signs of bronchospasm, shock, or hives  -Maintain 2 large bore peripheral IVs  -Given 2 liters IVF bolus and then NSS IVF at 125cc/hr  -s/p solumedrol 125mg IV x1.  Give Solumedrol 60mg IV BID x48 hours, no taper  -Continue Pepcid.  No role for additional H1 blocker at this time  -If blood pressure becomes an issue, can repeat 0.5mg IM Epi q5-15 mins or, if refractory, Epi gtt at 0.1mcg/kg/min and titrate from there  -Maintain carver catheter for today and monitor I/O's.  Anticipate discontinuing 7/5  -Needs close follow-up with Dr. Hernandez at [place] upon discharge    Lactic acidosis  -Lactate of 4.4 at presentation.  Suspect secondary to the above  -Trend lactate to resolution -> Will repeat w/ ABG at 3pm    Acute renal failure  -Baseline SrCr unknown.  No reported h/o renal failure.  SrCr acutely bumped to 1.4 which may be in setting of anaphylaxis  -IVF as outlined above  -Daily BMP.  Avoid nephrotoxins.  Renally dose medications    Atelectasis  -Mild patchy airspace disease at the right lung base on chest x-ray.  Suspect atelectasis.  No signs of pneumonia  -Attention on follow up imaging    Diabetes mellitus type 2  -Recent diagnosis.  A1C of 6.5  -Maintained on low dose Metformin and Losartan  -Monitor accu-checks.  SSI for coverage  --Home Metformin and Losartan acutely    Hyperlipidemia  -Maintained on Simvastatin outpt.  Hold for now    Diet: NPO  DVT prophylaxis: heparin  GI prophylaxis: Pepcid    Case d/w: Wife, Nursing, Dr. Che, Respiratory Therapy    Full Code    Total critical care time spent on this patient excluding any procedure time = 34  minutes.  Critical Condition, Guarded Prognosis

## 2018-07-04 NOTE — ASSESSMENT & PLAN NOTE
Hx of severe allergy to bee stings, gets allergy shots from Dr. Ele Hernandez at [place]  Had wasp sting on 6/30 without significant reaction but had another sting 7/4 and en route to the ER he became unresponsive  He received Epipen x 2 prior to arrival  In ER, pt was intubated for airway protection; he received additional Epi x 1, Solumedrol, Pepcid, and Benadryl  He required heavy sedation for combativeness    - maintain on mechanical ventilation for 24h; attempt vent wean today per pulmonary  - cont IV steroids - s/p 125mg Solumedrol x 1; now on 60mg IV Q12 for short term  - cont Pepcid IV  - propofol drip, appears calm for now  - lactic acidosis likely 2/2 anaphylactic reaction though pt remains hemodynamically stable and without signs of shock  - cont aggressive IV hydration  - ABG improved, lactate normalized    CC time: 25 min

## 2018-07-04 NOTE — H&P
Hospital Medicine Service -  History & Physical        CHIEF COMPLAINT   Anaphylaxis after bee sting     HISTORY OF PRESENT ILLNESS      Vernon Crawley Jr is a 68 y.o. male with a past medical history of HL, recently diagnosed type 2DM, and hx of bee sting allergy who presents with unresponsiveness after a bee sting.  History is obtained from chart review and discussion w/ nursing given that pt is intubated.    Pt had an anaphylactic reaction to bee stings in 2010 and has since been followed by allergy physician Dr. Ele Hernandez at [place] for allergy shots.  He has had several stings in the interim with milder reactions.  Pt was stung by a wasp on 6/30 and presented to the ER but did not have a significant reaction and ended up leaving before being seen because of lack of symptoms.      Then today, 7/4, pt was stung by a bee again.  His wife was bringing him to the ER when he became unresponsive.  She pulled over and gave him an EpiPen injection.  He was still unresponsive upon arrival.  He was bagged at the car and then intubated immediately for airway protection.  He subsequently received 2 more Epi injections, SoluMedrol, Benadryl, and Pepcid.  He eventually awoke and became extremely combative requiring high-dose sedation and restraints.      Currently, pt is in the ICU, sedated and ventilated.  He appears comfortable and he is hemodynamically stable.  He is arousable but becomes combative when awoken, pulling at the ET tube.      PAST MEDICAL AND SURGICAL HISTORY      PMH:  Hyperlipidemia  DM2 - recently diagnosed, started on metformin and losartan  Hx of anaphylaxis/allergy to bee/wasp venom    PSH:  Non-contributory    MEDICATIONS      Prior to Admission medications    Medication Sig Start Date End Date Taking? Authorizing Provider   losartan (COZAAR) 50 mg tablet Take 50 mg by mouth daily.    Historical Provider, MD   metFORMIN (GLUCOPHAGE) 500 mg tablet Take 500 mg by mouth 2 (two) times a day  with meals.    Historical Provider, MD   simvastatin (ZOCOR) 40 mg tablet Take 40 mg by mouth nightly.    Historical Provider, MD       ALLERGIES      No known drug allergies    FAMILY HISTORY      Family hx reviewed and non-contributory     SOCIAL HISTORY      Social History     Social History   • Marital status:      Spouse name: N/A   • Number of children: N/A   • Years of education: N/A     Social History Main Topics   • Smoking status: Per other notes (as pt intubated): former smoker, quit many years ago   • Smokeless tobacco: Not on file   • Alcohol use No significant alcohol use   • Drug use: None   • Sexual activity: Not on file     Other Topics Concern   • Not on file     Social History Narrative   • No narrative on file       REVIEW OF SYSTEMS      All other systems reviewed and negative except as noted in HPI    PHYSICAL EXAMINATION      Temp:  [36.5 °C (97.7 °F)-36.8 °C (98.3 °F)] 36.5 °C (97.7 °F)  Heart Rate:  [] 64  Resp:  [15-30] 20  BP: ()/(46-75) 111/64  FiO2 (%) (Set):  [40 %-100 %] 40 %  Body mass index is 35.22 kg/m².    General: WM, intubated, sedated, appears comfortable though intermittently restless   HENT: normocephalic, atraumatic, ETT in place  Eyes: anicteric sclera, pupils constricted but reactive and equal  Neck: supple  Resp: mildly rhonchorous, no wheeze, no stridor  Cardiac: RRR, no murmurs/rubs/gallop  Abdomen: soft, nontender, normal BS  Extremities: no LE edema  Neuro: nonfocal but sedated  Behavior: currently calm, sedated  Lymph: no adenopathy noted  Skin: clean, dry, intact; small apparent bite or sting w/ mild surrounding redness on medial side of left knee       LABS / IMAGING / EKG        Labs  Cr 1.4  Lactate 4.4    Imaging  CXR reviewed - ETT visualized, mild B/L infiltrate    ECG/Telemetry  NSR  EKG NSR w/ 1st degree AVB    Discussed care with:  Dr. Jasvir Weems    ASSESSMENT AND PLAN           * Anaphylactic reaction to bee sting   Assessment & Plan     Hx of severe allergy to bee stings, gets allergy shots from Dr. Ele Hernandez at [place]  Had wasp sting on 6/30 without significant reaction but had another sting today 7/4 and en route to the ER he became unresponsive  He received Epipen x 2 prior to arrival  In ER, pt was intubated for airway protection; he received additional Epi x 1, Solumedrol, Pepcid, and Benadryl  He required heavy sedation for combativeness    - maintain on mechanical ventilation for 24h  - cont IV steroids - s/p 125mg Solumedrol x 1; now on 60mg IV Q12 for short term  - cont Pepcid IV  - propofol drip, appears calm for now  - lactic acidosis likely 2/2 anaphylactic reaction though pt remains hemodynamically stable and without signs of shock  - cont aggressive IV hydration  - recheck ABG/lactate this afternoon, monitor CXR    CC time: 35 min        Acute renal insufficiency   Assessment & Plan    No previously noted hx of renal disease; no labs in our records  Suspect Cr bump to 1.4 related to anaphylaxis    - cont aggressive IV hydration  - recheck BMP in AM        Other hyperlipidemia   Assessment & Plan    Hold home simvastatin        Type 2 diabetes mellitus without complication, without long-term current use of insulin (CMS/[facility]) ([facility])   Assessment & Plan    Recently diagnosed, mild DM2  On metformin 500mg BID, losartan 50mg Q24    - hold metformin, losartan  - ISS/accuchecks  - expect high sugars in setting of steroids             VTE Assessment: Padua VTE Score: 5  Plan for VTE Prophylaxis: Lovenox    Code Status: Full Code    Estimated discharge date: 7/9/2018     Marlin Maldonado MD  7/4/2018

## 2018-07-05 ENCOUNTER — APPOINTMENT (INPATIENT)
Dept: RADIOLOGY | Facility: HOSPITAL | Age: 68
DRG: 917 | End: 2018-07-05
Attending: HOSPITALIST
Payer: MEDICARE

## 2018-07-05 LAB
ALBUMIN SERPL-MCNC: 3.3 G/DL (ref 3.4–5)
ALP SERPL-CCNC: 49 IU/L (ref 35–126)
ALT SERPL-CCNC: 15 IU/L (ref 16–63)
ANION GAP SERPL CALC-SCNC: 7 MEQ/L (ref 3–15)
AST SERPL-CCNC: 20 IU/L (ref 15–41)
ATRIAL RATE: 94
BACTERIA UR CULT: NORMAL
BILIRUB SERPL-MCNC: 0.6 MG/DL (ref 0.3–1.2)
BUN SERPL-MCNC: 16 MG/DL (ref 8–20)
CALCIUM SERPL-MCNC: 8.6 MG/DL (ref 8.9–10.3)
CHLORIDE SERPL-SCNC: 117 MMOL/L (ref 98–109)
CO2 SERPL-SCNC: 19 MMOL/L (ref 22–32)
CREAT SERPL-MCNC: 1.1 MG/DL (ref 0.8–1.3)
ERYTHROCYTE [DISTWIDTH] IN BLOOD BY AUTOMATED COUNT: 12.8 % (ref 11.6–14.4)
GFR SERPL CREATININE-BSD FRML MDRD: >60 ML/MIN/1.73M*2
GLUCOSE BLD-MCNC: 113 MG/DL (ref 70–99)
GLUCOSE BLD-MCNC: 158 MG/DL (ref 70–99)
GLUCOSE BLD-MCNC: 167 MG/DL (ref 70–99)
GLUCOSE BLD-MCNC: 183 MG/DL (ref 70–99)
GLUCOSE BLD-MCNC: 199 MG/DL (ref 70–99)
GLUCOSE SERPL-MCNC: 181 MG/DL (ref 70–99)
HCT VFR BLDCO AUTO: 38 % (ref 40–51)
HGB BLD-MCNC: 12.9 G/DL (ref 13.7–17.5)
LACTATE SERPL-SCNC: 1.6 MMOL/L (ref 0.4–2)
MAGNESIUM SERPL-MCNC: 2 MG/DL (ref 1.8–2.5)
MCH RBC QN AUTO: 31.9 PG (ref 28–33.2)
MCHC RBC AUTO-ENTMCNC: 33.9 G/DL (ref 32.2–36.5)
MCV RBC AUTO: 94.1 FL (ref 83–98)
P AXIS: 86
PDW BLD AUTO: 9.7 FL (ref 9.4–12.4)
PHOSPHATE SERPL-MCNC: 2.6 MG/DL (ref 2.4–4.7)
PLATELET # BLD AUTO: 288 K/UL (ref 150–350)
POTASSIUM SERPL-SCNC: 4.4 MMOL/L (ref 3.6–5.1)
PR INTERVAL: 258
PROT SERPL-MCNC: 5.8 G/DL (ref 6–8.2)
QRS DURATION: 90
QT INTERVAL: 368
QTC CALCULATION(BAZETT): 460
R AXIS: -6
RBC # BLD AUTO: 4.04 M/UL (ref 4.5–5.8)
SODIUM SERPL-SCNC: 143 MMOL/L (ref 136–144)
T WAVE AXIS: 32
TRIGL SERPL-MCNC: 129 MG/DL (ref 30–149)
VENTRICULAR RATE: 94
WBC # BLD AUTO: 14.54 K/UL (ref 3.8–10.5)

## 2018-07-05 PROCEDURE — 80053 COMPREHEN METABOLIC PANEL: CPT | Performed by: INTERNAL MEDICINE

## 2018-07-05 PROCEDURE — 99233 SBSQ HOSP IP/OBS HIGH 50: CPT | Performed by: HOSPITALIST

## 2018-07-05 PROCEDURE — 0BH18EZ INSERTION OF ENDOTRACHEAL AIRWAY INTO TRACHEA, VIA NATURAL OR ARTIFICIAL OPENING ENDOSCOPIC: ICD-10-PCS | Performed by: EMERGENCY MEDICINE

## 2018-07-05 PROCEDURE — 25000000 HC PHARMACY GENERAL: Performed by: INTERNAL MEDICINE

## 2018-07-05 PROCEDURE — 83605 ASSAY OF LACTIC ACID: CPT | Performed by: INTERNAL MEDICINE

## 2018-07-05 PROCEDURE — 63600000 HC DRUGS/DETAIL CODE: Performed by: HOSPITALIST

## 2018-07-05 PROCEDURE — 63600000 HC DRUGS/DETAIL CODE: Performed by: INTERNAL MEDICINE

## 2018-07-05 PROCEDURE — 85027 COMPLETE CBC AUTOMATED: CPT | Performed by: INTERNAL MEDICINE

## 2018-07-05 PROCEDURE — 84100 ASSAY OF PHOSPHORUS: CPT | Performed by: INTERNAL MEDICINE

## 2018-07-05 PROCEDURE — 5A1935Z RESPIRATORY VENTILATION, LESS THAN 24 CONSECUTIVE HOURS: ICD-10-PCS | Performed by: EMERGENCY MEDICINE

## 2018-07-05 PROCEDURE — 20600000 HC ROOM AND CARE INTERMEDIATE/TELEMETRY

## 2018-07-05 PROCEDURE — 25800000 HC PHARMACY IV SOLUTIONS: Performed by: INTERNAL MEDICINE

## 2018-07-05 PROCEDURE — 84478 ASSAY OF TRIGLYCERIDES: CPT | Performed by: INTERNAL MEDICINE

## 2018-07-05 PROCEDURE — 94003 VENT MGMT INPAT SUBQ DAY: CPT

## 2018-07-05 PROCEDURE — 36415 COLL VENOUS BLD VENIPUNCTURE: CPT | Performed by: INTERNAL MEDICINE

## 2018-07-05 PROCEDURE — 83735 ASSAY OF MAGNESIUM: CPT | Performed by: INTERNAL MEDICINE

## 2018-07-05 PROCEDURE — 71045 X-RAY EXAM CHEST 1 VIEW: CPT

## 2018-07-05 RX ORDER — INSULIN ASPART 100 [IU]/ML
6-10 INJECTION, SOLUTION INTRAVENOUS; SUBCUTANEOUS
Status: DISCONTINUED | OUTPATIENT
Start: 2018-07-05 | End: 2018-07-06 | Stop reason: HOSPADM

## 2018-07-05 RX ADMIN — PROPOFOL 45 MCG/KG/MIN: 10 INJECTION, EMULSION INTRAVENOUS at 04:43

## 2018-07-05 RX ADMIN — METHYLPREDNISOLONE SODIUM SUCCINATE 60 MG: 125 INJECTION, POWDER, FOR SOLUTION INTRAMUSCULAR; INTRAVENOUS at 16:30

## 2018-07-05 RX ADMIN — INSULIN ASPART 6 UNITS: 100 INJECTION, SOLUTION INTRAVENOUS; SUBCUTANEOUS at 22:59

## 2018-07-05 RX ADMIN — SODIUM CHLORIDE: 9 INJECTION, SOLUTION INTRAVENOUS at 01:36

## 2018-07-05 RX ADMIN — PROPOFOL 45 MCG/KG/MIN: 10 INJECTION, EMULSION INTRAVENOUS at 01:37

## 2018-07-05 RX ADMIN — MIDAZOLAM HYDROCHLORIDE 2 MG: 1 INJECTION, SOLUTION INTRAMUSCULAR; INTRAVENOUS at 03:20

## 2018-07-05 RX ADMIN — METHYLPREDNISOLONE SODIUM SUCCINATE 60 MG: 125 INJECTION, POWDER, FOR SOLUTION INTRAMUSCULAR; INTRAVENOUS at 01:38

## 2018-07-05 RX ADMIN — ENOXAPARIN SODIUM 40 MG: 40 INJECTION SUBCUTANEOUS at 17:44

## 2018-07-05 RX ADMIN — FAMOTIDINE 20 MG: 10 INJECTION, SOLUTION INTRAVENOUS at 21:57

## 2018-07-05 RX ADMIN — FAMOTIDINE 20 MG: 10 INJECTION, SOLUTION INTRAVENOUS at 08:49

## 2018-07-05 RX ADMIN — INSULIN ASPART 6 UNITS: 100 INJECTION, SOLUTION INTRAVENOUS; SUBCUTANEOUS at 13:10

## 2018-07-05 RX ADMIN — MIDAZOLAM HYDROCHLORIDE 2 MG: 1 INJECTION, SOLUTION INTRAMUSCULAR; INTRAVENOUS at 07:45

## 2018-07-05 NOTE — PROGRESS NOTES
Hospital Medicine Service -  Daily Progress Note       SUBJECTIVE   No acute events overnight.  Intermittently restless/pulling at tube but does well on sedation.     OBJECTIVE      Vital signs in last 24 hours:  Temp:  [36.3 °C (97.4 °F)-37.2 °C (99 °F)] 36.8 °C (98.2 °F)  Heart Rate:  [] 64  Resp:  [13-30] 20  BP: ()/(46-75) 121/55  FiO2 (%) (Set):  [40 %-100 %] 40 %    Intake/Output Summary (Last 24 hours) at 07/05/18 0751  Last data filed at 07/05/18 0749   Gross per 24 hour   Intake           3919.5 ml   Output             2375 ml   Net           1544.5 ml       PHYSICAL EXAMINATION      General: WM, intubated, sedated, appears comfortable though intermittently restless   HENT: normocephalic, atraumatic, ETT in place  Eyes: anicteric sclera, pupils reactive and equal  Neck: supple  Resp: mostly clear, no wheeze, no stridor  Cardiac: RRR, no murmurs/rubs/gallop  Abdomen: soft, nontender, normal BS  Extremities: no LE edema  Neuro: nonfocal but sedated  Behavior: currently calm, sedated  Lymph: no adenopathy noted  Skin: clean, dry, intact; small apparent bite or sting w/ much less redness today on medial side of left knee   LABS / IMAGING / TELE      Labs  All new labs reviewed  WBC 14  Cr 1.1    Imaging  CXR reviewed - infiltrates improved    ECG/Telemetry  NSR w/ 1st degree AVB    Discussed case with  Dr. Jasvir Weems    ASSESSMENT AND PLAN      * Anaphylactic reaction to bee sting   Assessment & Plan    Hx of severe allergy to bee stings, gets allergy shots from Dr. Ele Hernandez at [place]  Had wasp sting on 6/30 without significant reaction but had another sting 7/4 and en route to the ER he became unresponsive  He received Epipen x 2 prior to arrival  In ER, pt was intubated for airway protection; he received additional Epi x 1, Solumedrol, Pepcid, and Benadryl  He required heavy sedation for combativeness    - maintain on mechanical ventilation for 24h; attempt vent wean today per  pulmonary  - cont IV steroids - s/p 125mg Solumedrol x 1; now on 60mg IV Q12 for short term  - cont Pepcid IV  - propofol drip, appears calm for now  - lactic acidosis likely 2/2 anaphylactic reaction though pt remains hemodynamically stable and without signs of shock  - cont aggressive IV hydration  - ABG improved, lactate normalized    CC time: 25 min        Acute renal insufficiency   Assessment & Plan    No previously noted hx of renal disease; no labs in our records  Suspect Cr bump to 1.4 related to anaphylaxis    - cont aggressive IV hydration  - Cr improved to 1.1  - recheck BMP in AM        Other hyperlipidemia   Assessment & Plan    Hold home simvastatin        Type 2 diabetes mellitus without complication, without long-term current use of insulin (CMS/[facility]) (HCC)   Assessment & Plan    Recently diagnosed, mild DM2  On metformin 500mg BID, losartan 50mg Q24    - hold metformin, losartan  - ISS/accuchecks  - expect high sugars in setting of steroids             VTE Assessment: Padua VTE Score: 5  Current VTE Prophylaxis Plan: Lovenox    Code Status: Full Code    Estimated discharge date: 7/6/2018  Dispo Plan: home     Marlin Maldonado MD  7/5/2018  7:51 AM

## 2018-07-05 NOTE — PROGRESS NOTES
Critical Care Daily Progress Note    LOS: 1  ICU LOS: 1    POD#: 1  Procedure:   -Intubation & mechanical ventilation (7/4)    Subjective: Patient seen and examined.  No new complaints offered today.  No events noted overnight.  Very good cuff leak noted upon exam this morning.  Will move to extubate.  Wife at the bedside.    Allergies:   Bee venom protein (honey bee) and No known drug allergies    Medications:  Current Facility-Administered Medications   Medication Dose Route Frequency Provider Last Rate Last Dose   • glucose chewable tablet 16-32 g of dextrose  16-32 g of dextrose oral PRN Marlin Maldonado MD        Or   • dextrose 40 % oral gel 15-30 g of dextrose  15-30 g of dextrose oral PRN Marlin Maldonado MD        Or   • glucagon (GLUCAGEN) injection 1 mg  1 mg intramuscular PRN Marlin Maldonado MD        Or   • dextrose in water injection 12.5 g  25 mL intravenous PRN Marlin Maldonado MD       • enoxaparin (LOVENOX) syringe 40 mg  40 mg subcutaneous Daily (6p) Marlin Maldonado MD   40 mg at 07/04/18 1745   • famotidine (PEPCID) injection 20 mg  20 mg intravenous q12h [facility] Jasvir Weems, DO   20 mg at 07/04/18 2021   • insulin aspart U-100 (NovoLOG) pen 6-10 Units  6-10 Units subcutaneous q6h [facility] Marlin Maldonado MD       • methylPREDNISolone sod suc(PF) (Solu-MEDROL) injection 60 mg  60 mg intravenous q12h INT Jasvir Weems DO   60 mg at 07/05/18 0138   • sodium chloride 0.9 % infusion   intravenous Continuous Jasvri Weems  mL/hr at 07/05/18 0749         Vasoactive Agents:  None    Review of Systems:  Review of Systems   Unable to perform ROS: Intubated (ROS limited)       Objective     Input/Ouput in Last 24 hours:    Intake/Output Summary (Last 24 hours) at 07/05/18 0826  Last data filed at 07/05/18 0749   Gross per 24 hour   Intake           3919.5 ml   Output             2375 ml   Net           1544.5 ml       Vital Signs for the last 24 hours:  BP (!) 121/55   Pulse 71   Temp 36.8 °C (98.2 °F) (Bladder)    Resp 16   Ht 1.829 m (6')   Wt 118 kg (259 lb 11.2 oz)   SpO2 95%   BMI 35.22 kg/m²     Mode of Ventilation:  Mechanical Ventilation PS 10/5    Oxygen:  Oxygen Therapy: (P) Supplemental oxygen  O2 Delivery Method: (P) Endotracheal tube  FiO2 (%) (Set): (P) 40 %  O2 Flow Rate (L/min): 15 L/min     Lines:  Castillo catheter  Peripheral IV    Physical Exam:  Physical Exam   Constitutional: He appears well-developed and well-nourished.   HENT:   Head: Normocephalic and atraumatic.   ETT & OGT in place   Eyes: Pupils are equal, round, and reactive to light. No scleral icterus.   Neck: No tracheal deviation present.   Cardiovascular: Normal rate, regular rhythm and normal heart sounds.    Pulmonary/Chest: Effort normal and breath sounds normal.   Abdominal: Soft. Bowel sounds are normal.   Obese   Genitourinary:   Genitourinary Comments: Castillo catheter in place   Musculoskeletal: He exhibits no edema.   Neurological: He is alert.   Awake and alert   Skin: Skin is warm and dry.   Nursing note and vitals reviewed.      Labs:    Results from last 7 days  Lab Units 07/04/18  1506 07/04/18  1235   PH ART pH 7.41 7.25*   PCO2 ART mmHg 31* 42   PO2 ART mmHg 113* 231*   HCO3 ART mmol/L 20* 18*   O2 SAT ART % 98 100*   BASE EXC ART mmol/L -3.9 -8.5     Lab Results   Component Value Date    WBC 14.54 (H) 07/05/2018    HGB 12.9 (L) 07/05/2018    HCT 38.0 (L) 07/05/2018     07/05/2018    TRIG 129 07/05/2018    ALT 15 (L) 07/05/2018    AST 20 07/05/2018     07/05/2018    K 4.4 07/05/2018     (H) 07/05/2018    CREATININE 1.1 07/05/2018    BUN 16 07/05/2018    CO2 19 (L) 07/05/2018    INR 1.2 07/04/2018       Imaging/Radiology:      Chest x-ray (7/5):  ETT adequately positioned.  Lungs grossly clear (wet read)    EKG/Telemetry:  sinus rhythm at 78 bpm      Medical ICU IMPRESSION AND PLAN :    Ventilator dependent respiratory failure, resolved  -Intubated 7/4 for upper airway swelling secondary to anaphylaxis.  No  formal h/o lung disease  -Tolerating PS of 10/5 w/ RSBI of 33 suggesting favorable extubation  --Large air leak noted when endotracheal tube cuff put down.  Upper airway edema now likely resolved  -Extubate to room air this morning  -Wean Propofol gtt to off to facilitate extubation  -Nursing dysphagia screen post extubation and resume regular diet if passes  --Discontinue IVF when tolerating PO  -Discontinue carver catheter     Anaphylaxis, resolved  -Known h/o anaphylaxis w/ bee and wasp venom.  Getting allergy shots routinely.  Followed by Dr. Hernandez at [place]  -s/p IM EPI x3 total.  Remains without signs of bronchospasm, shock, or hives  -Maintain 2 large bore peripheral IVs  -NSS IVF at 125cc/hr -> Discontinue when taking adequate PO  -Solumedrol 60mg IV BID x48 hours, no taper  -Pepcid IV  -Needs close follow-up with Dr. Hernandez at [place] upon discharge     Lactic acidosis, resolved  -Lactate of 4.4 at presentation.  Suspect secondary to the above  -Trend lactate to resolution -> 1.6 (normal)     Acute renal failure, resolved  -Baseline SrCr unknown.  No reported h/o renal failure.  SrCr acutely bumped to 1.4 which may be in setting of anaphylaxis.  Improved to 1.1 w/ IVF and supportive care  -Daily BMP.  Avoid nephrotoxins.  Renally dose medications     Atelectasis, resolved  -Mild patchy airspace disease at the right lung base on initial chest x-ray.  Suspect atelectasis.  No signs of pneumonia  -Not present on follow up imaging     Diabetes mellitus type 2  -Recent diagnosis.  A1C of 6.5  -Maintained on low dose Metformin and Losartan  -Monitor accu-checks.  SSI for coverage  --Home Metformin and Losartan to be managed per primary     Hyperlipidemia  -Maintained on Simvastatin outpt.  Can resume once passes dysphagia screen     Diet: Advance to regular diet if passes dysphagia screen  DVT prophylaxis: heparin  GI prophylaxis: Pepcid     Case d/w: Wife, Nursing, Dr. Maldonado, Respiratory Therapy     Full  Code.  Observe in ICU for today.  Anticipate downgrade/discharge 7/6    The case was reviewed this AM at multidisciplinary rounds with the patient's nurse, dietician, pharmacist, respiratory therapist, , , and critical care nurse coordinator. The patient's clinical status with regards to diagnosis and treatment plans including disposition of any IV, arterial lines, carver, and tubes were discussed as well as dietary, respiratory therapy, and mobilization needs. This process required approximately 10 minutes of coordination of care.

## 2018-07-05 NOTE — PROGRESS NOTES
"Met with pt and explained role of care coordination team.     LIVING ARRANGEMENT: lives with wife, mother in law and son in 1 story home.  Has ramp into home    DME: glucometer.  Has \"handicapped\" set up, including raided toilet seats, grab bars, ramp into home which were placed for mother in law    HOME SERVICES: denies history of visiting nurses    ADL'S: independent with adl's   Not homebound   Drives car   Works as consultant    POA: wife, kimberly  [phone]  H. [phone]    PCP: madeline boogie/nikhil browne     SERVICE: denies  service  "

## 2018-07-05 NOTE — CONSULTS
"Wound Care Consult     Subjective okay probably bruised by butt when I got bee sting    Vernon Crawley Jr is a 68 y.o. male who was admitted for Allergic reaction, initial encounter [T78.40XA]  Acute respiratory failure, unspecified whether with hypoxia or hypercapnia (CMS/HCC) ([facility]) [J96.00]. Patient was seen in consultation at the request of referring physician for management recommendations. Patient is alert and highly allergic to bees was stung several times and had reaction before getting epipen    Medical History:   Past Medical History:   Diagnosis Date   • Anaphylactic reaction to bee sting    • Lipid disorder    • Type 2 diabetes mellitus (CMS/HCC) ([facility])        Surgical History: No past surgical history on file.    Social History:   Social History     Social History Narrative   • No narrative on file       Family History: No family history on file.    Allergies: Bee venom protein (honey bee) and No known drug allergies    Home Medications:  •  cholecalciferol (vitamin D3), Take 5,000 Units by mouth daily.  •  losartan, Take by mouth daily.    •  metFORMIN, Take 500 mg by mouth 2 (two) times a day with meals.  •  simvastatin, Take 40 mg by mouth nightly.    Current Medications:  •  glucose, 16-32 g of dextrose, oral, PRN **OR** dextrose, 15-30 g of dextrose, oral, PRN **OR** glucagon, 1 mg, intramuscular, PRN **OR** dextrose in water, 25 mL, intravenous, PRN  •  enoxaparin, 40 mg, subcutaneous, Daily (6p)  •  famotidine, 20 mg, intravenous, q12h DAYANNA  •  insulin aspart U-100, 6-10 Units, subcutaneous, With meals & nightly  •  methylPREDNISolone sodium succinate, 60 mg, intravenous, q12h INT    Review of Systems  Pertinent items are noted in HPI.    Objective     Physicial Exam  /71 (BP Location: Right upper arm, Patient Position: Sitting)   Pulse 93   Temp 36.8 °C (98.2 °F) (Bladder)   Resp (!) 22   Ht 1.829 m (6' 0.01\")   Wt 117 kg (259 lb)   SpO2 96%   BMI 35.12 kg/m²     General appearance: " alert, appears stated age and cooperative  Head: normocephalic, without obvious abnormality, atraumatic  Skin: resolving bee stings and bruise on right buttock  Wound Pressure Injury Right Buttock (Active)   Wound Appearance ecchymotic 7/5/2018  2:00 PM   Drainage Amount other (see comments) 7/5/2018  2:00 PM       Neurologic: Grossly normal    Labs  No new labs.    Imaging  Not applicable    No pressure injury there is a 2cm bruise on right buttock patient was doing extensive yard work when he was stung and bruise may have occurred then  Monitor closely for changes no other intervention needed at this time      CONNER Leiva  7/5/2018

## 2018-07-05 NOTE — NURSING NOTE
07:30 Patient received on propofol 35 mcg/kg/mon, restless & pulling @ restraints. Versed given without relief of anxiety, 15 min propofol titrated to 40 mcg/kg/mon. With BAILEY  0. Wife at bedside. 08:00 Dr. Weems in to assess the patient. POC- D/C propofol & extubate when awake. 08:15 Patient extubated to room air per Dr. Weems. No stridor, rrr, nonlabored.   11:00 OOB/C with assist of 1, gait steady.   12:00 Patient written for telemetry, no bed a available.   13:00 Accu check 199, covered with insulin 6 units sq. Patient stated feeling funny on administration, then felt fine. Anxious about receiving insulin as he takes metformin at home. Discussed rational with the patient. POC= monitor Accu ck.

## 2018-07-05 NOTE — CONSULTS
"Nutrition Assessment  Reason for consult: Vent screen    Recommendations:  1. Continue with [id number], NCS diet  as tolerated  2.  Weekly weights  3. Offered and refused diet education    Monitor:  1. % PO intake  2. Weight changes  3. Renal Labs/accuchecks    Subjective:Pt visited. Resting in chair, just got breakfast tray.  Denied any n/v/d.  Was extubated this morning. Had allergic reaction to bee sting.  Denied any chew/swallow concerns.  #, has lost 8 lbs in the past 6 mths, trying to eat healthier and lose wt. Has education, outpt counseling, and contact info. Reports he is supposed to contact them to get counseling this week but hasn't done it. Offered and refused diet education, reports \"can get testy\".  Left contact info in case pt changes his mind. Seems frustrated to have diabetes.     Nutrition Charting Type: Nutrition Assessment    Clinical Course: Patient is a 68 y.o. male who was admitted on 7/4/2018 with a diagnosis of Allergic reaction, initial encounter [T78.40XA]  Acute respiratory failure, unspecified whether with hypoxia or hypercapnia (CMS/HCC) ([facility]) [J96.00]. Allergic reaction to bee sting.PERFECTO resolved.  Intubated 7/4 at 1141 but extubated 7/5 0800 in the morning. Passes dysphagia screen. Diet: [id number], NCS.  [id number] ranging from 157 to 176-steroids likely contributing.     Past Medical History:   Diagnosis Date   • Anaphylactic reaction to bee sting    • Lipid disorder    • Type 2 diabetes mellitus (CMS/HCC) ([facility])      No past surgical history on file.         Dietary Orders            Start     Ordered    07/05/18 0904  Adult Diet Regular; Consistent Carbohydrate 1800; No Concentrated Sweets; RD/LDN may adjust order  Diet effective now     Question Answer Comment   Diet Texture Regular    Diabetic Restrictions: Consistent Carbohydrate 1800    Other Restriction(s): No Concentrated Sweets    Delegation of Authority. Diet orders written by PA/CRKendall may not be adjusted by RD/LDNs. " "RD/LDN may adjust order        07/05/18 0903          General Information  Nutrition Evaluation/Patient Follow-Up: Mildly Nutritionally Compromised-Follow up in 4-6 days (PERFECTO)  Reason for Consult: Provider order (vent)     RUST Nutrition Screen Tool  Has patient lost weight without trying?: 0-->No  Has patient been eating poorly due to decreased appetite?: 0-->No  RUST Nutrition Screen Score: 0     Nutrition/Diet History  Patient Reported Diet: carbohydrate counting  Appetite Prior to Admission: Figtcydul-%  Intake (%): 100%     Physical Findings  Additional Documentation: Physical Appearance (Group)     Physical Appearance  Overall Physical Appearance: obese (well nourished)  Skin: other (see comments) (wound L knee, Press Injury R buttock no stage)     Nutrition Order Review  Nutrition Order Review: meets nutritional requirements  Nutrition Order Review Comments: [id number], NCS     Anthropometrics  Height Method: stated  Height: 182.9 cm (6' 0.01\")     Admit Weight  Admit Weight Method: measured  Admit Weight: 117 kg (259 lb)     Current Weight  Current Weight Method: measured  Weight: 117 kg (259 lb)     Ideal Body Weight (IBW)  Ideal Body Weight (IBW) (kg): 82.09  % Ideal Body Weight: 143.11     Usual Body Weight (UBW)  Usual Body Weight: 117 kg (258 lb)  % Usual Body Weight: 100.39  % of Usual Body Weight Assessment: not applicable  Weight Change: No weight change  Time Frame: 1 Week      Body Mass Index (BMI)  BMI (Calculated): 35.1  BMI Assessment: BMI 35-39.9: obesity grade II                       Labs/Procedures/Meds  Additional Documentation: Pertinent Medications (Group), Lab Results (Group)     Lab Results  Lab Results Reviewed: reviewed, pertinent   Accuchecks [phone]      Pertinent Medications  Pertinent Medications Reviewed: reviewed, pertinent     Estimated/Assessed Needs  Additional Documentation: Protein Requirements (Group), Calorie Requirements (Group), Fluid Requirements (Group) "     Calorie Requirements  Energy Calorie Reqirements (kcal): 2400 (to 2800)  Estimated kCal Needs: Adjusted Body Weight (96.4 kg)  Energy Need Method: kcal/kg  Calorie/kg Recommended: 25-30                 Protein Requirements  Protein Recommended: 1.0-1.2  Recommended Dosing Weight(Estimated Protein Needs): Adjusted Body Weight (96.4 kg)                                                                      enoxaparin 40 mg subcutaneous Daily (6p)   famotidine 20 mg intravenous q12h DAYANNA   insulin aspart U-100 6-10 Units subcutaneous q6h DAYANNA   methylPREDNISolone sodium succinate 60 mg intravenous q12h INT       sodium chloride 0.9 %  Last Rate: 125 mL/hr at 07/05/18 0749       Weights (last 7 days)     Date/Time   Weight    07/05/18 1000  117 kg (259 lb)    07/04/18 1913  118 kg (259 lb 11.2 oz)    07/04/18 1807  118 kg (259 lb 11.2 oz)    07/04/18 1400  118 kg (259 lb 11.2 oz)    07/04/18 11:50:50  118 kg (260 lb 2.3 oz)              No results found for: HGBA1C    CMP Results       07/05/18 07/04/18                       0429 1216           139          K 4.4 3.4 (L)          Cl 117 (H) 109          CO2 19 (L) 22          Glucose 181 (H) 231 (H)          BUN 16 17          Creatinine 1.1 1.4 (H)          Calcium 8.6 (L) 8.5 (L)          Anion Gap 7 8          AST 20 23          ALT 15 (L) 15 (L)          Albumin 3.3 (L) 3.7          EGFR &gt;60.0 50.7 (L)          Comment for K at 1216 on 07/04/18:    Results obtained on plasma. Plasma Potassium values may be up to 0.4 mEQ/L less than serum values. The differences may be greater for patients with high platelet or white cell counts.          20 Kcal/Kg (kcal): 2349.64  25 Kcal/Kg (kcal): 2937.05  30 Kcal/Kg (kcal): 3524.46  35 Kcal/Kg (kcal): 4111.87  40 Kcal/Kg (kcal): 4699.28  45 Kcal/Kg (kcal): 5286.69  50 Kcal/Kg (kcal): 5874.1  Energy Calorie Reqirements (kcal): 2400 (to 2800)  Energy Need Method: kcal/kg    PES Statement: PES Statement  Nutritional Needs  Met?: Yes  Nutrition Status Classification: Mild nutritional compromise (PU)                                   Nutrition Level:  1    Date: 07/05/18  Signature: Emy Schaefer RD , ABHIJIT

## 2018-07-05 NOTE — PLAN OF CARE
Problem: Patient Care Overview  Goal: Plan of Care Review  Outcome: Ongoing (interventions implemented as appropriate)   07/05/18 1059   Coping/Psychosocial   Plan Of Care Reviewed With patient   Plan of Care Review   Progress improving   Outcome Summary extubated 7/5, passed dyshagia screen, diet adv, excellent appetite, refused written diet ed

## 2018-07-05 NOTE — PLAN OF CARE
Problem: Patient Care Overview  Goal: Plan of Care Review  Outcome: Ongoing (interventions implemented as appropriate)   07/05/18 1751   Coping/Psychosocial   Plan Of Care Reviewed With patient   Plan of Care Review   Progress progress toward functional goals as expected       Problem: Skin Integrity Impairment, Risk/Actual (Adult)  Goal: Skin Integrity/Wound Healing  Outcome: Ongoing (interventions implemented as appropriate)   07/05/18 1751   Skin Integrity Impairment, Risk/Actual (Adult)   Skin Integrity/Wound Healing making progress toward outcome       Problem: Fall Risk (Adult)  Goal: Absence of Falls  Outcome: Ongoing (interventions implemented as appropriate)   07/05/18 1751   Fall Risk (Adult)   Absence of Falls making progress toward outcome

## 2018-07-05 NOTE — PLAN OF CARE
Problem: Ventilation, Mechanical Invasive (Adult)  Goal: Signs and Symptoms of Listed Potential Problems Will be Absent, Minimized or Managed (Ventilation, Mechanical Invasive)  Outcome: Ongoing (interventions implemented as appropriate)   07/05/18 0448   Ventilation, Mechanical Invasive   Problems Assessed (Mechanical Ventilation, Invasive) all   Problems Present (Mechanical Ventilation, Invasive) immobility   Patient is on vent and sedated, unable to follow any commands, do response to voice and pain stimuli. Bilateral wrist restrains. Bed alarm on foe patient safety, call light within a reach.     Problem: Restraint, Nonbehavioral (Nonviolent)  Goal: Rationale and Justification  Outcome: Ongoing (interventions implemented as appropriate)    Goal: Nonbehavioral (Nonviolent) Restraint: Absence of Injury/Harm  Outcome: Ongoing (interventions implemented as appropriate)    Goal: Nonbehavioral (Nonviolent) Restraint: Achievement of Discontinuation Criteria  Outcome: Ongoing (interventions implemented as appropriate)    Goal: Nonbehavioral (Nonviolent) Restraint: Preservation of Dignity/Well-being  Outcome: Ongoing (interventions implemented as appropriate)      Problem: Patient Care Overview  Goal: Plan of Care Review  Outcome: Ongoing (interventions implemented as appropriate)    Goal: Individualization & Mutuality  Outcome: Ongoing (interventions implemented as appropriate)      Problem: Skin Integrity Impairment, Risk/Actual (Adult)  Goal: Identify Related Risk Factors and Signs and Symptoms  Outcome: Outcome(s) Achieved Date Met: 07/05/18    Goal: Skin Integrity/Wound Healing  Outcome: Ongoing (interventions implemented as appropriate)      Problem: Fall Risk (Adult)  Goal: Identify Related Risk Factors and Signs and Symptoms  Outcome: Outcome(s) Achieved Date Met: 07/05/18    Goal: Absence of Falls  Outcome: Ongoing (interventions implemented as appropriate)

## 2018-07-05 NOTE — PLAN OF CARE
Problem: Restraint, Nonbehavioral (Nonviolent)  Goal: Nonbehavioral (Nonviolent) Restraint: Absence of Injury/Harm  Outcome: Ongoing (interventions implemented as appropriate)    Goal: Nonbehavioral (Nonviolent) Restraint: Preservation of Dignity/Well-being  Outcome: Ongoing (interventions implemented as appropriate)      Problem: Patient Care Overview  Goal: Plan of Care Review  Outcome: Outcome(s) Achieved Date Met: 07/05/18    Goal: Individualization & Mutuality  Outcome: Ongoing (interventions implemented as appropriate)    Goal: Discharge Needs Assessment  Outcome: Ongoing (interventions implemented as appropriate)    Goal: Interprofessional Rounds/Family Conf  Outcome: Ongoing (interventions implemented as appropriate)      Problem: Skin Integrity Impairment, Risk/Actual (Adult)  Goal: Skin Integrity/Wound Healing  Outcome: Ongoing (interventions implemented as appropriate)      Problem: Fall Risk (Adult)  Goal: Absence of Falls  Outcome: Ongoing (interventions implemented as appropriate)

## 2018-07-05 NOTE — NURSING NOTE
16:00 Report called to [address] Rn. Patients wife transferred patients personal belongings. Cell phone, tables & . Patient placed on telemetry monitor. All belongings transferred with the patient.

## 2018-07-05 NOTE — PLAN OF CARE
Problem: Patient Care Overview  Goal: Discharge Needs Assessment   07/05/18 9656   DC Needs Assessment   Concerns To Be Addressed denies needs/concerns at this time   Readmission Within The Last 30 Days no previous admission in last 30 days   Anticipated Discharge Disposition home without services   Activity/Self Care ROS   Equipment Currently Used at Home glucometer

## 2018-07-06 VITALS
HEIGHT: 72 IN | SYSTOLIC BLOOD PRESSURE: 139 MMHG | BODY MASS INDEX: 35.07 KG/M2 | WEIGHT: 258.9 LBS | DIASTOLIC BLOOD PRESSURE: 68 MMHG | HEART RATE: 66 BPM | RESPIRATION RATE: 20 BRPM | OXYGEN SATURATION: 94 % | TEMPERATURE: 98 F

## 2018-07-06 LAB
ANION GAP SERPL CALC-SCNC: 6 MEQ/L (ref 3–15)
BUN SERPL-MCNC: 21 MG/DL (ref 8–20)
CALCIUM SERPL-MCNC: 8.8 MG/DL (ref 8.9–10.3)
CHLORIDE SERPL-SCNC: 113 MMOL/L (ref 98–109)
CO2 SERPL-SCNC: 24 MMOL/L (ref 22–32)
CREAT SERPL-MCNC: 1 MG/DL (ref 0.8–1.3)
ERYTHROCYTE [DISTWIDTH] IN BLOOD BY AUTOMATED COUNT: 13.1 % (ref 11.6–14.4)
GFR SERPL CREATININE-BSD FRML MDRD: >60 ML/MIN/1.73M*2
GLUCOSE BLD-MCNC: 177 MG/DL (ref 70–99)
GLUCOSE SERPL-MCNC: 156 MG/DL (ref 70–99)
HCT VFR BLDCO AUTO: 38.2 % (ref 40–51)
HGB BLD-MCNC: 12.9 G/DL (ref 13.7–17.5)
MCH RBC QN AUTO: 32.5 PG (ref 28–33.2)
MCHC RBC AUTO-ENTMCNC: 33.8 G/DL (ref 32.2–36.5)
MCV RBC AUTO: 96.2 FL (ref 83–98)
PDW BLD AUTO: 10 FL (ref 9.4–12.4)
PLATELET # BLD AUTO: 292 K/UL (ref 150–350)
POTASSIUM SERPL-SCNC: 4.4 MMOL/L (ref 3.6–5.1)
RBC # BLD AUTO: 3.97 M/UL (ref 4.5–5.8)
SODIUM SERPL-SCNC: 143 MMOL/L (ref 136–144)
WBC # BLD AUTO: 18.15 K/UL (ref 3.8–10.5)

## 2018-07-06 PROCEDURE — 99239 HOSP IP/OBS DSCHRG MGMT >30: CPT | Performed by: HOSPITALIST

## 2018-07-06 PROCEDURE — 63600000 HC DRUGS/DETAIL CODE: Performed by: INTERNAL MEDICINE

## 2018-07-06 PROCEDURE — 25000000 HC PHARMACY GENERAL: Performed by: INTERNAL MEDICINE

## 2018-07-06 PROCEDURE — 85027 COMPLETE CBC AUTOMATED: CPT | Performed by: HOSPITALIST

## 2018-07-06 PROCEDURE — 36415 COLL VENOUS BLD VENIPUNCTURE: CPT | Performed by: HOSPITALIST

## 2018-07-06 PROCEDURE — 80048 BASIC METABOLIC PNL TOTAL CA: CPT | Performed by: HOSPITALIST

## 2018-07-06 RX ADMIN — METHYLPREDNISOLONE SODIUM SUCCINATE 60 MG: 125 INJECTION, POWDER, FOR SOLUTION INTRAMUSCULAR; INTRAVENOUS at 04:18

## 2018-07-06 RX ADMIN — FAMOTIDINE 20 MG: 10 INJECTION, SOLUTION INTRAVENOUS at 08:09

## 2018-07-06 NOTE — PROGRESS NOTES
Discharge order noted. Met with patient and his wife. Reviewed IMM, patient signed and declined copy. Patient has no skilled needs at this time.

## 2018-07-06 NOTE — DISCHARGE INSTRUCTIONS
You were treated for an anaphylactic reaction to a bee sting.  Because of throat swelling, you required intubation but thankfully, everything got better.      You will need close follow up with Dr. Hernandez.    You may resume your regular medications.    Please avoid the bees!!    Take care!

## 2018-07-06 NOTE — PLAN OF CARE
Problem: Patient Care Overview  Goal: Plan of Care Review  Outcome: Ongoing (interventions implemented as appropriate)   07/06/18 0227   Coping/Psychosocial   Plan Of Care Reviewed With patient   Plan of Care Review   Progress progress toward functional goals as expected       Problem: Skin Integrity Impairment, Risk/Actual (Adult)  Goal: Skin Integrity/Wound Healing  Outcome: Ongoing (interventions implemented as appropriate)      Problem: Fall Risk (Adult)  Goal: Absence of Falls  Outcome: Ongoing (interventions implemented as appropriate)

## 2018-07-06 NOTE — NURSING NOTE
Bilat saline lock's d/c'd. + bleeding to r hand. Gauze and taped with a pressure drsg. Wife @bedside. Dressed in own clothes. Cell phone with pt. To lobby via w/c for transport home in private vehicle. Pt to make appt with PMD and allergist.

## 2018-07-06 NOTE — DISCHARGE SUMMARY
Hospital Medicine Service -  Inpatient Discharge Summary        BRIEF OVERVIEW   Admitting Provider: Marlin Maldonado MD  Attending Provider: Marlin Maldonado MD Attending phys phone: [phone]    PCP: Kareem Weiner [phone]    Admission Date: 7/4/2018  Discharge Date: 7/6/2018     DISCHARGE DIAGNOSES      Active Hospital Problems    Diagnosis Date Noted   • Anaphylactic reaction to bee sting 07/04/2018     Priority: High   • Acute renal insufficiency 07/04/2018     Priority: Medium   • Other hyperlipidemia 07/04/2018     Priority: Low   • Type 2 diabetes mellitus without complication, without long-term current use of insulin (CMS/[facility]) ([facility]) 05/16/2018     Priority: Low      Resolved Hospital Problems    Diagnosis Date Noted Date Resolved   No resolved problems to display.     SUMMARY OF HOSPITALIZATION      Pt is 67 yo M w/ PMH of HTN, DM, severe bee sting allergy admitted after a bee sting who went unresponsive en route to the hospital.  Pt was in anaphylaxis though did not go into full shock.  He was intubated in the ER and swollen airway was noted.  He received epi x 3, Solumedrol, Pepcid, and Benadryl.  He was able to be extubated after 24 hours and was weaned completely off oxygen.  He had mild PERFECTO on admission which improved w/ aggressive IV hydration.  He had no symptoms on day of discharge.  He already follows closely w/ Dr. Hernandez (allergy) and is on bee venom shots (though he reports that per Dr. Hernandez, he has an unusually severe allergy to bee venom).  He was encouraged to try very hard to avoid any bees!        Pt was personally seen and examined on day of discharge.      Discharge coordination including med rec, instructions, counseling, and dispo = 35 minutes.      Pertinent Imaging During Hospital Stay:  X-ray Chest 1 View    Result Date: 7/5/2018  IMPRESSION: Mild cardiomegaly. Lines and tubes in expected location. Trace left basilar pleural effusion versus pleural thickening. Mild  bilateral basilar atelectasis and/or infiltrates.     X-ray Chest 1 View    Result Date: 7/4/2018  IMPRESSION: An esophagogastric tube terminates within the upper abdomen to the right of midline likely within the gastric antrum or duodenal bulb.    X-ray Chest 1 View    Result Date: 7/4/2018  IMPRESSION: Limited study, as above. Endotracheal tube with its tip in satisfactory position. Mild patchy airspace disease at the right lung base.        DISCHARGE MEDICATIONS        Medication List      CONTINUE taking these medications    cholecalciferol (vitamin D3) 5,000 unit tablet  Take 5,000 Units by mouth daily.     losartan 50 mg tablet  Commonly known as:  COZAAR  Take by mouth daily.     metFORMIN 500 mg tablet  Commonly known as:  GLUCOPHAGE  Take 500 mg by mouth 2 (two) times a day with meals.     simvastatin 40 mg tablet  Commonly known as:  ZOCOR  Take 40 mg by mouth nightly.            Instructions for after discharge     Diet       Diet Type:  Cardiac (2gm Sodium/Low Fat)    Follow-up with Provider:       Instructions for follow-up:  within next 2 weeks    Follow-up with primary care provider       Instructions for follow-up:  within 3-5 days    Post-Discharge Activity: Normal activity as tolerated.       Normal activity as tolerated.             PENDING TESTS        None    OUTPATIENT  FOLLOW-UP        Important Issues to Address in Follow-Up  F/u with allergy    DISCHARGED TO:      Disposition: home

## 2018-07-16 ENCOUNTER — TRANSCRIBE ORDERS (OUTPATIENT)
Dept: SCHEDULING | Age: 68
End: 2018-07-16

## 2018-07-16 DIAGNOSIS — E11.9 TYPE 2 DIABETES MELLITUS WITHOUT COMPLICATIONS (CMS/HCC): Primary | ICD-10-CM

## 2018-08-08 ENCOUNTER — OFFICE VISIT (OUTPATIENT)
Dept: NUTRITION | Facility: HOSPITAL | Age: 68
End: 2018-08-08
Attending: FAMILY MEDICINE
Payer: MEDICARE

## 2018-08-08 VITALS — BODY MASS INDEX: 33.33 KG/M2 | WEIGHT: 251.5 LBS | HEIGHT: 73 IN

## 2018-08-08 DIAGNOSIS — E11.9 TYPE 2 DIABETES MELLITUS WITHOUT COMPLICATION, WITHOUT LONG-TERM CURRENT USE OF INSULIN (CMS/HCC): ICD-10-CM

## 2018-08-08 DIAGNOSIS — E11.9 TYPE 2 DIABETES MELLITUS WITHOUT COMPLICATIONS (CMS/HCC): ICD-10-CM

## 2018-08-08 PROCEDURE — 97803 MED NUTRITION INDIV SUBSEQ: CPT | Performed by: DIETITIAN, REGISTERED

## 2018-08-08 NOTE — PROGRESS NOTES
Diabetes Management Program    Subjective      Patient ID: Mr. Vernon Crawley Jr is a 68 y.o. male who has been referred for MNT as part of the Skills & Management class at [place].      Diabetes Medication/Instruction:   Current Outpatient Prescriptions:   •  cholecalciferol, vitamin D3, 5,000 unit tablet, Take 5,000 Units by mouth daily., Disp: , Rfl:   •  losartan (COZAAR) 50 mg tablet, Take by mouth daily.  , Disp: , Rfl:   •  metFORMIN (GLUCOPHAGE) 500 mg tablet, Take 500 mg by mouth 2 (two) times a day with meals., Disp: , Rfl:   •  simvastatin (ZOCOR) 40 mg tablet, Take 40 mg by mouth nightly., Disp: , Rfl:       SBGM:    using    Schedule:  5 days per week     1 Tests per day fasting and 2hrs. pp  Comments: -146  Physicial Activity: Discussed benefits of exercise and various options  Comments: Plans to join [facility] [employer]    Meal Planning: Instructed basics of carb counting, basic portions, meal spacing and Given suggested distribution 45 grams of CHO per meal, always have protein with his CHO       Goals     • Follow your diet plan as advised by your nutritionist     • Increase physical activity      • Record your blood sugar as directed

## 2018-08-08 NOTE — LETTER
August 8, 2018     Chula Riggs MD  [address]  [place] [address]    Patient: Vernon Crawley Jr   YOB: 1950   Date of Visit: 8/8/2018       Dear Dr. Riggs:    Thank you for referring Vernon Crwaley Jr to me for evaluation. Below are my notes for this consultation.    If you have questions, please do not hesitate to call me. I look forward to following your patient along with you.         Sincerely,      Marybeth Tomlinson, RD,LDN,CDE  [facility] REGISTERED DIETITIAN I        CC: No Recipients  Diabetes Management Program    Subjective      Patient ID: Mr. Vernon Crawley Jr is a 68 y.o. male who has been referred for MNT as part of the Skills & Management class at [place].      Diabetes Medication/Instruction:   Current Outpatient Prescriptions:   •  cholecalciferol, vitamin D3, 5,000 unit tablet, Take 5,000 Units by mouth daily., Disp: , Rfl:   •  losartan (COZAAR) 50 mg tablet, Take by mouth daily.  , Disp: , Rfl:   •  metFORMIN (GLUCOPHAGE) 500 mg tablet, Take 500 mg by mouth 2 (two) times a day with meals., Disp: , Rfl:   •  simvastatin (ZOCOR) 40 mg tablet, Take 40 mg by mouth nightly., Disp: , Rfl:       SBGM:    using    Schedule:  5 days per week     1 Tests per day fasting and 2hrs. pp  Comments: -146  Physicial Activity: Discussed benefits of exercise and various options  Comments: Plans to join [facility] [employer]    Meal Planning: Instructed basics of carb counting, basic portions, meal spacing and Given suggested distribution 45 grams of CHO per meal, always have protein with his CHO       Goals     • Follow your diet plan as advised by your nutritionist     • Increase physical activity      • Record your blood sugar as directed

## 2019-06-17 ENCOUNTER — TRANSCRIBE ORDERS (OUTPATIENT)
Dept: SCHEDULING | Age: 69
End: 2019-06-17

## 2019-06-17 DIAGNOSIS — M54.2 CERVICALGIA: Primary | ICD-10-CM

## 2019-06-17 DIAGNOSIS — M47.812 SPONDYLOSIS OF CERVICAL REGION WITHOUT MYELOPATHY OR RADICULOPATHY: ICD-10-CM

## 2019-06-17 DIAGNOSIS — M54.12 RADICULOPATHY OF CERVICAL REGION: ICD-10-CM

## 2019-06-19 ENCOUNTER — HOSPITAL ENCOUNTER (OUTPATIENT)
Dept: RADIOLOGY | Facility: HOSPITAL | Age: 69
Discharge: HOME | End: 2019-06-19
Attending: PHYSICAL MEDICINE & REHABILITATION
Payer: MEDICARE

## 2019-06-19 DIAGNOSIS — M54.2 CERVICALGIA: ICD-10-CM

## 2019-06-19 DIAGNOSIS — M47.812 SPONDYLOSIS OF CERVICAL REGION WITHOUT MYELOPATHY OR RADICULOPATHY: ICD-10-CM

## 2019-06-19 DIAGNOSIS — M54.12 RADICULOPATHY OF CERVICAL REGION: ICD-10-CM

## 2019-06-19 PROCEDURE — 72141 MRI NECK SPINE W/O DYE: CPT

## 2019-07-01 ENCOUNTER — TRANSCRIBE ORDERS (OUTPATIENT)
Dept: SCHEDULING | Age: 69
End: 2019-07-01

## 2019-07-01 DIAGNOSIS — E04.1 NONTOXIC SINGLE THYROID NODULE: Primary | ICD-10-CM

## 2019-07-05 ENCOUNTER — HOSPITAL ENCOUNTER (OUTPATIENT)
Dept: RADIOLOGY | Facility: HOSPITAL | Age: 69
Discharge: HOME | End: 2019-07-05
Attending: FAMILY MEDICINE
Payer: MEDICARE

## 2019-07-05 DIAGNOSIS — E04.1 NONTOXIC SINGLE THYROID NODULE: ICD-10-CM

## 2019-07-05 PROCEDURE — 76536 US EXAM OF HEAD AND NECK: CPT

## 2019-07-23 ENCOUNTER — TELEPHONE (OUTPATIENT)
Dept: RADIOLOGY | Facility: HOSPITAL | Age: 69
End: 2019-07-23

## 2019-07-24 ENCOUNTER — HOSPITAL ENCOUNTER (OUTPATIENT)
Dept: RADIOLOGY | Facility: HOSPITAL | Age: 69
Discharge: HOME | End: 2019-07-24
Attending: FAMILY MEDICINE | Admitting: RADIOLOGY
Payer: MEDICARE

## 2019-07-24 ENCOUNTER — APPOINTMENT (OUTPATIENT)
Dept: RADIOLOGY | Facility: HOSPITAL | Age: 69
End: 2019-07-24
Attending: FAMILY MEDICINE
Payer: MEDICARE

## 2019-07-24 VITALS
SYSTOLIC BLOOD PRESSURE: 126 MMHG | DIASTOLIC BLOOD PRESSURE: 66 MMHG | OXYGEN SATURATION: 98 % | TEMPERATURE: 97 F | HEART RATE: 58 BPM | RESPIRATION RATE: 18 BRPM

## 2019-07-24 DIAGNOSIS — E04.1 NONTOXIC UNINODULAR GOITER: ICD-10-CM

## 2019-07-24 PROCEDURE — 71000001 HC PACU PHASE 1 INITIAL 30MIN

## 2019-07-24 PROCEDURE — 0G9G3ZX DRAINAGE OF LEFT THYROID GLAND LOBE, PERCUTANEOUS APPROACH, DIAGNOSTIC: ICD-10-PCS | Performed by: RADIOLOGY

## 2019-07-24 PROCEDURE — 88173 CYTOPATH EVAL FNA REPORT: CPT | Mod: 59 | Performed by: FAMILY MEDICINE

## 2019-07-24 PROCEDURE — 36100360 US GUIDED THYROID BIOPSY

## 2019-07-24 RX ORDER — GABAPENTIN 300 MG/1
600 CAPSULE ORAL NIGHTLY
Refills: 1 | COMMUNITY
Start: 2019-07-08

## 2019-07-24 NOTE — H&P
Inpatient History & Physical     Admitting Diagnosis: Nontoxic uninodular goiter [E04.1]    HPI  Patient is a 69 y.o. male identifies as male who presents with thyroid nodules.     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 No pertinent surgical history.    Allergies: Bee venom protein (honey bee) and No known drug allergies    [unfilled]    Social History:   Social History     Social History   • Marital status:      Spouse name: N/A   • Number of children: N/A   • Years of education: N/A     Social History Main Topics   • Smoking status: Former Smoker     Packs/day: 3.00     Start date: 1971     Quit date: 2000   • Smokeless tobacco: Never Used   • Alcohol use No   • Drug use: Unknown   • Sexual activity: Defer     Other Topics Concern   • None     Social History Narrative   • None       Family History: History reviewed. No pertinent family history.    Review of Systems  Constitutional: negative    Objective     Vital Signs for the last 24 hours:  Temp:  [36.1 °C (97 °F)] 36.1 °C (97 °F)  Heart Rate:  [52-60] 52  Resp:  [18] 18  BP: (126)/(59-72) 126/72      No exam performed today, not required.    Labs   No new labs.    Imaging  Significant findings include: thyroid nodules    ECG/Telemetry  n/a    Assessment/Plan     For biopsy of two left thyroid nodules

## 2019-07-24 NOTE — POST-PROCEDURE NOTE
Interventional Radiology Brief Postprocedure Note    Vernon Crawley Jr.     Attending: Anders    Assistant: none    Diagnosis: thyroid nodules    Description of procedure: US guided biopsy    Contrast: none     Anesthesia:  Local    Medications: none     Complications: None      Estimated Blood Loss: Estimated Blood Loss: None    Anticoagulation: N/A    Specimens: 4 x 22 gauge aspirates x 2    Findings: Biopsy performed.    7/24/2019 1:43 PM

## 2019-07-25 LAB
CASE RPRT: NORMAL
CLINICAL INFO: NORMAL
PATH REPORT.FINAL DX SPEC: NORMAL
PATH REPORT.GROSS SPEC: NORMAL

## 2019-08-05 ENCOUNTER — APPOINTMENT (OUTPATIENT)
Dept: LAB | Facility: HOSPITAL | Age: 69
End: 2019-08-05
Attending: INTERNAL MEDICINE
Payer: MEDICARE

## 2019-08-05 ENCOUNTER — TRANSCRIBE ORDERS (OUTPATIENT)
Dept: LAB | Facility: HOSPITAL | Age: 69
End: 2019-08-05

## 2019-08-05 DIAGNOSIS — D89.49 OTHER MAST CELL ACTIVATION DISORDER: ICD-10-CM

## 2019-08-05 DIAGNOSIS — D72.828 OTHER ELEVATED WHITE BLOOD CELL COUNT: ICD-10-CM

## 2019-08-05 DIAGNOSIS — D72.828 OTHER ELEVATED WHITE BLOOD CELL COUNT: Primary | ICD-10-CM

## 2019-08-05 LAB
ALBUMIN SERPL-MCNC: 3.9 G/DL (ref 3.4–5)
ALP SERPL-CCNC: 65 IU/L (ref 35–126)
ALT SERPL-CCNC: 21 IU/L (ref 16–63)
ANION GAP SERPL CALC-SCNC: 8 MEQ/L (ref 3–15)
AST SERPL-CCNC: 15 IU/L (ref 15–41)
BILIRUB SERPL-MCNC: 0.9 MG/DL (ref 0.3–1.2)
BUN SERPL-MCNC: 20 MG/DL (ref 8–20)
CALCIUM SERPL-MCNC: 9.6 MG/DL (ref 8.9–10.3)
CHLORIDE SERPL-SCNC: 108 MEQ/L (ref 98–109)
CO2 SERPL-SCNC: 24 MEQ/L (ref 22–32)
CREAT SERPL-MCNC: 1.2 MG/DL
GFR SERPL CREATININE-BSD FRML MDRD: >60 ML/MIN/1.73M*2
GLUCOSE SERPL-MCNC: 94 MG/DL (ref 70–99)
LDH SERPL L TO P-CCNC: 134 IU/L (ref 98–192)
POTASSIUM SERPL-SCNC: 4.5 MEQ/L (ref 3.6–5.1)
PROT SERPL-MCNC: 6.7 G/DL (ref 6–8.2)
SODIUM SERPL-SCNC: 140 MEQ/L (ref 136–144)

## 2019-08-05 PROCEDURE — 83520 IMMUNOASSAY QUANT NOS NONAB: CPT

## 2019-08-05 PROCEDURE — 36415 COLL VENOUS BLD VENIPUNCTURE: CPT

## 2019-08-05 PROCEDURE — 83615 LACTATE (LD) (LDH) ENZYME: CPT

## 2019-08-05 PROCEDURE — 88184 FLOWCYTOMETRY/ TC 1 MARKER: CPT

## 2019-08-05 PROCEDURE — 80053 COMPREHEN METABOLIC PANEL: CPT

## 2019-08-05 PROCEDURE — 81270 JAK2 GENE: CPT | Mod: GZ

## 2019-08-05 PROCEDURE — 30000001 MISCELLANEOUS LAB TEST: Mod: GZ

## 2019-08-07 LAB
CASE RPRT: NORMAL
CLINICAL INFO: NORMAL
PATH INTERP SPEC-IMP: NORMAL

## 2019-08-08 LAB — TRYPTASE SERPL-MCNC: 9.5 MCG/L

## 2019-08-10 ENCOUNTER — HOSPITAL ENCOUNTER (EMERGENCY)
Facility: HOSPITAL | Age: 69
Discharge: HOME | End: 2019-08-10
Attending: EMERGENCY MEDICINE
Payer: MEDICARE

## 2019-08-10 VITALS
SYSTOLIC BLOOD PRESSURE: 126 MMHG | RESPIRATION RATE: 16 BRPM | TEMPERATURE: 98.2 F | DIASTOLIC BLOOD PRESSURE: 69 MMHG | HEART RATE: 90 BPM | OXYGEN SATURATION: 95 %

## 2019-08-10 DIAGNOSIS — T63.444A BEE STING, UNDETERMINED INTENT, INITIAL ENCOUNTER: Primary | ICD-10-CM

## 2019-08-10 PROCEDURE — 25000000 HC PHARMACY GENERAL: Performed by: PHYSICIAN ASSISTANT

## 2019-08-10 PROCEDURE — 3E0333Z INTRODUCTION OF ANTI-INFLAMMATORY INTO PERIPHERAL VEIN, PERCUTANEOUS APPROACH: ICD-10-PCS | Performed by: EMERGENCY MEDICINE

## 2019-08-10 PROCEDURE — 99284 EMERGENCY DEPT VISIT MOD MDM: CPT | Mod: 25

## 2019-08-10 PROCEDURE — 63600000 HC DRUGS/DETAIL CODE: Performed by: PHYSICIAN ASSISTANT

## 2019-08-10 PROCEDURE — 25800000 HC PHARMACY IV SOLUTIONS: Performed by: PHYSICIAN ASSISTANT

## 2019-08-10 PROCEDURE — 96374 THER/PROPH/DIAG INJ IV PUSH: CPT

## 2019-08-10 PROCEDURE — 3E033GC INTRODUCTION OF OTHER THERAPEUTIC SUBSTANCE INTO PERIPHERAL VEIN, PERCUTANEOUS APPROACH: ICD-10-PCS | Performed by: EMERGENCY MEDICINE

## 2019-08-10 PROCEDURE — 96375 TX/PRO/DX INJ NEW DRUG ADDON: CPT

## 2019-08-10 RX ORDER — PREDNISONE 20 MG/1
40 TABLET ORAL DAILY
Qty: 10 TABLET | Refills: 0 | Status: SHIPPED | OUTPATIENT
Start: 2019-08-10 | End: 2021-09-15

## 2019-08-10 RX ORDER — DIPHENHYDRAMINE HCL 50 MG/ML
25 VIAL (ML) INJECTION ONCE
Status: COMPLETED | OUTPATIENT
Start: 2019-08-10 | End: 2019-08-10

## 2019-08-10 RX ORDER — FAMOTIDINE 10 MG/ML
20 INJECTION INTRAVENOUS ONCE
Status: COMPLETED | OUTPATIENT
Start: 2019-08-10 | End: 2019-08-10

## 2019-08-10 RX ORDER — EPINEPHRINE 0.3 MG/.3ML
1 INJECTION SUBCUTANEOUS AS NEEDED
Qty: 1 SYRINGE | Refills: 0 | Status: SHIPPED | OUTPATIENT
Start: 2019-08-10

## 2019-08-10 RX ADMIN — DIPHENHYDRAMINE HYDROCHLORIDE 50 MG: 50 INJECTION INTRAMUSCULAR; INTRAVENOUS at 11:37

## 2019-08-10 RX ADMIN — METHYLPREDNISOLONE SODIUM SUCCINATE 125 MG: 125 INJECTION, POWDER, FOR SOLUTION INTRAMUSCULAR; INTRAVENOUS at 11:36

## 2019-08-10 RX ADMIN — SODIUM CHLORIDE 1000 ML: 9 INJECTION, SOLUTION INTRAVENOUS at 11:53

## 2019-08-10 RX ADMIN — FAMOTIDINE 20 MG: 10 INJECTION INTRAVENOUS at 11:37

## 2019-08-10 ASSESSMENT — ENCOUNTER SYMPTOMS
SHORTNESS OF BREATH: 0
TROUBLE SWALLOWING: 0
VOICE CHANGE: 0
DIARRHEA: 0
COUGH: 0
VOMITING: 0
WHEEZING: 0

## 2019-08-10 NOTE — DISCHARGE INSTRUCTIONS
Take Benadryl around the clock through Monday morning. Start steroids tomorrow. Refill EpiPen. Return to the ER for rash/itching, lip/tongue swelling, voice changes, shortness of breath, severe abdominal pain, vomiting, diarrhea, or any other concerning symptoms.

## 2019-08-10 NOTE — ED PROVIDER NOTES
"HPI     Chief Complaint   Patient presents with   • Insect Bite           ** 70 y/o male with PMHx significant for anaphylaxis due to bee stings (most recent intubation/ICU admission 7/2018) presents for evaluation after a bee sting that occurred at 1015. States he was stung in left arm by one yellow jacket at 1015. States he immediately administered EpiPen. Here for monitoring. Sees an allergist and is getting \"injections\". Denies any symptoms at this time. Denies lip/tongue swelling, voice changes, shortness of breath, vomiting, diarrhea, lightheadedness, or diffuse rash. 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 No pertinent surgical history.    History reviewed. No pertinent family history.    Social History   Substance Use Topics   • Smoking status: Former Smoker     Packs/day: 3.00     Start date: 1971     Quit date: 2000   • Smokeless tobacco: Never Used   • Alcohol use No       Systems Reviewed from Nursing Triage:          Review of Systems     Review of Systems   HENT: Negative for trouble swallowing and voice change.    Respiratory: Negative for cough, shortness of breath and wheezing.    Cardiovascular: Negative for chest pain.   Gastrointestinal: Negative for diarrhea and vomiting.   Skin: Negative for rash.        Physical Exam     ED Triage Vitals [08/10/19 1054]   Temp Heart Rate Resp BP SpO2   37.1 °C (98.8 °F) 90 16 134/75 98 %      Temp Source Heart Rate Source Patient Position BP Location FiO2 (%) (Set)   Oral -- Sitting Right upper arm --                     Patient Vitals for the past 24 hrs:   BP Temp Temp src Pulse Resp SpO2   08/10/19 1317 126/67 36.7 °C (98 °F) Oral - 14 97 %   08/10/19 1054 134/75 37.1 °C (98.8 °F) Oral 90 16 98 %           Physical Exam   Constitutional: He is oriented to person, place, and time. He appears " well-developed and well-nourished.   HENT:   Head: Normocephalic.   Normal voice  No oropharyngeal edema  Controlling saliva regularly   Eyes: EOM are normal.   Neck: Neck supple.   Cardiovascular: Normal rate, regular rhythm and normal heart sounds.    Pulmonary/Chest: Effort normal and breath sounds normal.   Neurological: He is alert and oriented to person, place, and time.   Skin: Skin is warm and dry.   Localized 2x1 cm raised erythema with central puncture in left antecubital region  No diffuse rash   Psychiatric: He has a normal mood and affect.   Nursing note and vitals reviewed.           Procedures    ED Course & MDM     Labs Reviewed - No data to display    No orders to display               MDM         ED Course as of Aug 10 1429   Sat Aug 10, 2019   1130 Normal VS. Well-appearing. Asymptomatic. Plan to observe until 1415. Discussed case with Dr. Hernandez-- patient asymptomatic but will give IV fluids, Solumedrol, Benadryl, and Pepcid given hx of anaphylaxis.  [TT]   1330 Continuing to do well. Sleep from Benadryl. Still asymptomatic.  [TT]   1427 Still doing well. Repeat exam WNL. Received EpiPen over 4 hours ago, still asymptomatic. Plan to d/c with EpiPen and steroids. Will continue Benadryl for 2 days. Discussed return precautions. Will follow-u with allergist.  [TT]      ED Course User Index  [TT] Jessica Ley PA C         Clinical Impressions as of Aug 10 1429   Bee sting, undetermined intent, initial encounter        Jessica Ley PA C  08/10/19 1429

## 2019-08-10 NOTE — ED ATTESTATION NOTE
The patient was evaluated and managed by the physician assistant / nurse practitioner.     Jayme Morales,   08/10/19 2102

## 2019-08-29 LAB — LABORATORY REPORT: NORMAL

## 2019-09-09 PROCEDURE — 88313 SPECIAL STAINS GROUP 2: CPT | Mod: 59 | Performed by: INTERNAL MEDICINE

## 2019-09-10 ENCOUNTER — LAB REQUISITION (OUTPATIENT)
Dept: LAB | Facility: HOSPITAL | Age: 69
End: 2019-09-10
Attending: INTERNAL MEDICINE
Payer: MEDICARE

## 2019-09-10 DIAGNOSIS — D72.828 OTHER ELEVATED WHITE BLOOD CELL COUNT: ICD-10-CM

## 2019-09-18 LAB
CASE RPRT: NORMAL
CLINICAL INFO: NORMAL
IMMUNE STAIN STUDY: NORMAL
PATH REPORT.ADDENDUM SPEC: NORMAL
PATH REPORT.FINAL DX SPEC: NORMAL
PATH REPORT.GROSS SPEC: NORMAL
PATH REPORT.MICROSCOPIC SPEC OTHER STN: NORMAL

## 2020-12-07 ENCOUNTER — HOSPITAL ENCOUNTER (OUTPATIENT)
Dept: RADIOLOGY | Facility: HOSPITAL | Age: 70
Discharge: HOME | End: 2020-12-07
Attending: FAMILY MEDICINE
Payer: MEDICARE

## 2020-12-07 ENCOUNTER — TRANSCRIBE ORDERS (OUTPATIENT)
Dept: REGISTRATION | Facility: HOSPITAL | Age: 70
End: 2020-12-07

## 2020-12-07 DIAGNOSIS — I71.40 ABDOMINAL AORTIC ANEURYSM, WITHOUT RUPTURE: ICD-10-CM

## 2020-12-07 DIAGNOSIS — I71.40 ABDOMINAL AORTIC ANEURYSM, WITHOUT RUPTURE: Primary | ICD-10-CM

## 2020-12-07 PROCEDURE — 76775 US EXAM ABDO BACK WALL LIM: CPT

## 2021-04-15 DIAGNOSIS — Z23 ENCOUNTER FOR IMMUNIZATION: ICD-10-CM

## 2021-08-26 ENCOUNTER — TRANSCRIBE ORDERS (OUTPATIENT)
Dept: SCHEDULING | Age: 71
End: 2021-08-26

## 2021-08-26 DIAGNOSIS — R09.89 OTHER SPECIFIED SYMPTOMS AND SIGNS INVOLVING THE CIRCULATORY AND RESPIRATORY SYSTEMS: Primary | ICD-10-CM

## 2021-08-27 ENCOUNTER — HOSPITAL ENCOUNTER (OUTPATIENT)
Dept: RADIOLOGY | Age: 71
Discharge: HOME | End: 2021-08-27
Attending: FAMILY MEDICINE
Payer: MEDICARE

## 2021-08-27 DIAGNOSIS — R09.89 OTHER SPECIFIED SYMPTOMS AND SIGNS INVOLVING THE CIRCULATORY AND RESPIRATORY SYSTEMS: ICD-10-CM

## 2021-08-27 PROCEDURE — 71250 CT THORAX DX C-: CPT

## 2021-09-15 ENCOUNTER — TRANSCRIBE ORDERS (OUTPATIENT)
Dept: SCHEDULING | Age: 71
End: 2021-09-15
Payer: MEDICARE

## 2021-09-15 DIAGNOSIS — D35.00 BENIGN NEOPLASM OF UNSPECIFIED ADRENAL GLAND: Primary | ICD-10-CM

## 2021-09-22 ENCOUNTER — HOSPITAL ENCOUNTER (OUTPATIENT)
Dept: RADIOLOGY | Facility: HOSPITAL | Age: 71
Discharge: HOME | End: 2021-09-22
Attending: FAMILY MEDICINE
Payer: MEDICARE

## 2021-09-22 DIAGNOSIS — D35.00 BENIGN NEOPLASM OF UNSPECIFIED ADRENAL GLAND: ICD-10-CM

## 2021-09-22 PROCEDURE — 74183 MRI ABD W/O CNTR FLWD CNTR: CPT

## 2021-09-22 PROCEDURE — A9585 GADOBUTROL INJECTION: HCPCS | Mod: JW

## 2021-09-22 RX ORDER — GADOBUTROL 604.72 MG/ML
0.1 INJECTION INTRAVENOUS ONCE
Status: COMPLETED | OUTPATIENT
Start: 2021-09-22 | End: 2021-09-22

## 2021-09-22 RX ADMIN — GADOBUTROL 11 MMOL: 604.72 INJECTION INTRAVENOUS at 14:22

## 2022-03-02 ENCOUNTER — TRANSCRIBE ORDERS (OUTPATIENT)
Dept: SCHEDULING | Age: 72
End: 2022-03-02

## 2022-03-02 DIAGNOSIS — E27.9 DISORDER OF ADRENAL GLAND, UNSPECIFIED: Primary | ICD-10-CM

## 2022-03-02 DIAGNOSIS — C25.9 MALIGNANT NEOPLASM OF PANCREAS, UNSPECIFIED: ICD-10-CM

## 2022-03-07 ENCOUNTER — HOSPITAL ENCOUNTER (OUTPATIENT)
Dept: RADIOLOGY | Facility: HOSPITAL | Age: 72
Discharge: HOME | End: 2022-03-07
Attending: FAMILY MEDICINE
Payer: MEDICARE

## 2022-03-07 DIAGNOSIS — C25.9 MALIGNANT NEOPLASM OF PANCREAS, UNSPECIFIED: ICD-10-CM

## 2022-03-07 DIAGNOSIS — E27.9 DISORDER OF ADRENAL GLAND, UNSPECIFIED: ICD-10-CM

## 2022-03-07 PROCEDURE — A9585 GADOBUTROL INJECTION: HCPCS | Mod: JW

## 2022-03-07 PROCEDURE — 74183 MRI ABD W/O CNTR FLWD CNTR: CPT

## 2022-03-07 RX ORDER — GADOBUTROL 604.72 MG/ML
0.1 INJECTION INTRAVENOUS ONCE
Status: COMPLETED | OUTPATIENT
Start: 2022-03-07 | End: 2022-03-07

## 2022-03-07 RX ADMIN — GADOBUTROL 11 MMOL: 604.72 INJECTION INTRAVENOUS at 16:00

## 2022-06-29 ENCOUNTER — TRANSCRIBE ORDERS (OUTPATIENT)
Dept: SCHEDULING | Age: 72
End: 2022-06-29

## 2022-06-29 DIAGNOSIS — R31.29 OTHER MICROSCOPIC HEMATURIA: Primary | ICD-10-CM

## 2022-07-07 ENCOUNTER — HOSPITAL ENCOUNTER (OUTPATIENT)
Dept: RADIOLOGY | Facility: HOSPITAL | Age: 72
Discharge: HOME | End: 2022-07-07
Attending: UROLOGY
Payer: MEDICARE

## 2022-07-07 DIAGNOSIS — R31.29 OTHER MICROSCOPIC HEMATURIA: ICD-10-CM

## 2022-07-07 PROCEDURE — 74178 CT ABD&PLV WO CNTR FLWD CNTR: CPT

## 2022-07-07 PROCEDURE — 63600105 HC IODINE BASED CONTRAST: Performed by: UROLOGY

## 2022-07-07 RX ORDER — IODIXANOL 320 MG/ML
100 INJECTION, SOLUTION INTRAVASCULAR ONCE
Status: COMPLETED | OUTPATIENT
Start: 2022-07-07 | End: 2022-07-07

## 2022-07-07 RX ADMIN — IODIXANOL 100 ML: 320 INJECTION, SOLUTION INTRAVASCULAR at 10:19

## 2022-07-25 ENCOUNTER — TRANSCRIBE ORDERS (OUTPATIENT)
Dept: SCHEDULING | Age: 72
End: 2022-07-25

## 2022-07-25 DIAGNOSIS — Z01.812 ENCOUNTER FOR PREPROCEDURAL LABORATORY EXAMINATION: Primary | ICD-10-CM

## 2022-08-10 ENCOUNTER — APPOINTMENT (OUTPATIENT)
Dept: PREADMISSION TESTING | Facility: HOSPITAL | Age: 72
End: 2022-08-10
Payer: MEDICARE

## 2022-08-10 VITALS — BODY MASS INDEX: 31.94 KG/M2 | WEIGHT: 241 LBS | HEIGHT: 73 IN

## 2022-08-10 NOTE — PRE-PROCEDURE INSTRUCTIONS
1. Admissions will call you with your arrival time on 8/16 (day prior to surgery) between 2pm - 4pm. For questions about your arrival time, please call [phone].    2. Please report to the [facility] in the [place] on the day of your procedure. Enter the hospital through the [facility] (main entrance at [address], [place]). If you are parking in the [facility], come to the ground floor of the garage and follow signs to the [facility]. Bring your insurance card and photo ID.     3. Please follow these fasting guidelines:  - STOP all solid food 8 hours prior to arrival.   - No more than 12oz of water is permitted and must STOP 2 HOURS prior to arrival to the hospital.     4. Early on the morning of the procedure, please take the following medications listed below with a sip of water, in addition to any medications prescribed by your surgeon: none    *NO aspirin, ibuprofen, anti-inflammatories, fish oil or Vitamin E unless ordered by physician.    *Stop taking supplements     5. Other instructions: antibacterial shower x 2, brush teeth    6. If you develop a cough, cold, fever, or other symptom prior to the date of the procedure, please report it to your physician immediately.    7. If you need to cancel the procedure for any reason, please contact your physician.    8. Make arrangements to have someone drive you home from the procedure. If you have not arranged for transportation home, your surgery may be cancelled.     9. You may not take public transportation or a cab unless accompanied by a responsible person.    10. You may not drive a car or operate complex or potentially dangerous machinery for 24 hours following anesthesia and/or sedation.    11. If it is medically necessary for you to have a longer stay, you will be informed as soon as the decision is made.    12. Do not wear or bring anything of value to the hospital, including medication or jewelry of any  kind. Do not wear make-up or contact lenses. Do bring your glasses and hearing aid, with a case. If you use a CPAP machine and will be overnight, please bring it with you. If you use any inhalers, please bring them as well.     13. Dress in comfortable clothes.    14. If instructed, please bring a copy of your Advance Directive (Living Will/Durable Power of ) on the day of your procedure.    15. Ensuring your safety at all times is a very important part of our [facility] Culture of Safety. After having surgery and sedation, you are at risk for falling and balance issues. Although you may feel awake, the effects of the medication can last up to 24 hours after anesthesia. If you need to use the bathroom during your recovery period, nursing staff will escort you there and stay with you to ensure your safety.    Pre operative instructions given as per protocol.  Form explained by:

## 2022-08-12 ENCOUNTER — APPOINTMENT (OUTPATIENT)
Dept: LAB | Facility: HOSPITAL | Age: 72
End: 2022-08-12
Attending: UROLOGY
Payer: MEDICARE

## 2022-08-12 ENCOUNTER — TRANSCRIBE ORDERS (OUTPATIENT)
Dept: REGISTRATION | Facility: HOSPITAL | Age: 72
End: 2022-08-12

## 2022-08-12 DIAGNOSIS — D41.4 NEOPLASM OF UNCERTAIN BEHAVIOR OF BLADDER: Primary | ICD-10-CM

## 2022-08-12 DIAGNOSIS — D41.4 NEOPLASM OF UNCERTAIN BEHAVIOR OF BLADDER: ICD-10-CM

## 2022-08-12 DIAGNOSIS — Z01.812 ENCOUNTER FOR PREPROCEDURAL LABORATORY EXAMINATION: ICD-10-CM

## 2022-08-12 LAB
ALBUMIN SERPL-MCNC: 3.9 G/DL (ref 3.4–5)
ALP SERPL-CCNC: 58 IU/L (ref 35–126)
ALT SERPL-CCNC: 24 IU/L (ref 16–63)
ANION GAP SERPL CALC-SCNC: 7 MEQ/L (ref 3–15)
AST SERPL-CCNC: 18 IU/L (ref 15–41)
BASOPHILS # BLD: 0.02 K/UL (ref 0.01–0.1)
BASOPHILS NFR BLD: 0.3 %
BILIRUB SERPL-MCNC: 1 MG/DL (ref 0.3–1.2)
BUN SERPL-MCNC: 21 MG/DL (ref 8–20)
CALCIUM SERPL-MCNC: 9.6 MG/DL (ref 8.9–10.3)
CHLORIDE SERPL-SCNC: 110 MEQ/L (ref 98–109)
CO2 SERPL-SCNC: 23 MEQ/L (ref 22–32)
CREAT SERPL-MCNC: 1.2 MG/DL (ref 0.8–1.3)
DIFFERENTIAL METHOD BLD: ABNORMAL
EOSINOPHIL # BLD: 0.07 K/UL (ref 0.04–0.54)
EOSINOPHIL NFR BLD: 1 %
ERYTHROCYTE [DISTWIDTH] IN BLOOD BY AUTOMATED COUNT: 12.9 % (ref 11.6–14.4)
GFR SERPL CREATININE-BSD FRML MDRD: 59.9 ML/MIN/1.73M*2
GLUCOSE SERPL-MCNC: 90 MG/DL (ref 70–99)
HCT VFR BLDCO AUTO: 44.5 % (ref 40.1–51)
HGB BLD-MCNC: 14 G/DL (ref 13.7–17.5)
IMM GRANULOCYTES # BLD AUTO: 0.02 K/UL (ref 0–0.08)
IMM GRANULOCYTES NFR BLD AUTO: 0.3 %
LYMPHOCYTES # BLD: 1.96 K/UL (ref 1.2–3.5)
LYMPHOCYTES NFR BLD: 28.9 %
MCH RBC QN AUTO: 31 PG (ref 28–33.2)
MCHC RBC AUTO-ENTMCNC: 31.5 G/DL (ref 32.2–36.5)
MCV RBC AUTO: 98.5 FL (ref 83–98)
MONOCYTES # BLD: 0.59 K/UL (ref 0.3–1)
MONOCYTES NFR BLD: 8.7 %
NEUTROPHILS # BLD: 4.12 K/UL (ref 1.7–7)
NEUTS SEG NFR BLD: 60.8 %
NRBC BLD-RTO: 0 %
PDW BLD AUTO: 9.8 FL (ref 9.4–12.4)
PLATELET # BLD AUTO: 256 K/UL (ref 150–350)
POTASSIUM SERPL-SCNC: 4.7 MEQ/L (ref 3.6–5.1)
PROT SERPL-MCNC: 6 G/DL (ref 6–8.2)
RBC # BLD AUTO: 4.52 M/UL (ref 4.5–5.8)
SARS-COV-2 RNA RESP QL NAA+PROBE: NEGATIVE
SODIUM SERPL-SCNC: 140 MEQ/L (ref 136–144)
WBC # BLD AUTO: 6.78 K/UL (ref 3.8–10.5)

## 2022-08-12 PROCEDURE — 36415 COLL VENOUS BLD VENIPUNCTURE: CPT

## 2022-08-12 PROCEDURE — U0003 INFECTIOUS AGENT DETECTION BY NUCLEIC ACID (DNA OR RNA); SEVERE ACUTE RESPIRATORY SYNDROME CORONAVIRUS 2 (SARS-COV-2) (CORONAVIRUS DISEASE [COVID-19]), AMPLIFIED PROBE TECHNIQUE, MAKING USE OF HIGH THROUGHPUT TECHNOLOGIES AS DESCRIBED BY CMS-2020-01-R: HCPCS

## 2022-08-12 PROCEDURE — 85025 COMPLETE CBC W/AUTO DIFF WBC: CPT

## 2022-08-12 PROCEDURE — C9803 HOPD COVID-19 SPEC COLLECT: HCPCS

## 2022-08-12 PROCEDURE — 80053 COMPREHEN METABOLIC PANEL: CPT

## 2022-08-17 ENCOUNTER — ANESTHESIA EVENT (OUTPATIENT)
Dept: OPERATING ROOM | Facility: HOSPITAL | Age: 72
Setting detail: HOSPITAL OUTPATIENT SURGERY
End: 2022-08-17
Payer: MEDICARE

## 2022-08-17 ENCOUNTER — HOSPITAL ENCOUNTER (OUTPATIENT)
Facility: HOSPITAL | Age: 72
Setting detail: HOSPITAL OUTPATIENT SURGERY
Discharge: HOME | End: 2022-08-17
Attending: UROLOGY | Admitting: UROLOGY
Payer: MEDICARE

## 2022-08-17 VITALS
RESPIRATION RATE: 18 BRPM | HEART RATE: 66 BPM | OXYGEN SATURATION: 95 % | SYSTOLIC BLOOD PRESSURE: 131 MMHG | TEMPERATURE: 97.1 F | DIASTOLIC BLOOD PRESSURE: 65 MMHG

## 2022-08-17 DIAGNOSIS — D41.4 NEOPLASM OF UNCERTAIN BEHAVIOR OF BLADDER: ICD-10-CM

## 2022-08-17 LAB
GLUCOSE BLD-MCNC: 126 MG/DL (ref 70–99)
GLUCOSE BLD-MCNC: 127 MG/DL (ref 70–99)
POCT TEST: ABNORMAL
POCT TEST: ABNORMAL

## 2022-08-17 PROCEDURE — 71000012 HC PACU PHASE 2 EA ADDL MIN: Performed by: UROLOGY

## 2022-08-17 PROCEDURE — 71000001 HC PACU PHASE 1 INITIAL 30MIN: Performed by: UROLOGY

## 2022-08-17 PROCEDURE — 36000002 HC OR LEVEL 2 INITIAL 30MIN: Performed by: UROLOGY

## 2022-08-17 PROCEDURE — 36000012 HC OR LEVEL 2 EA ADDL MIN: Performed by: UROLOGY

## 2022-08-17 PROCEDURE — 71000002 HC PACU PHASE 2 INITIAL 30MIN: Performed by: UROLOGY

## 2022-08-17 PROCEDURE — 0TBB8ZX EXCISION OF BLADDER, VIA NATURAL OR ARTIFICIAL OPENING ENDOSCOPIC, DIAGNOSTIC: ICD-10-PCS | Performed by: UROLOGY

## 2022-08-17 PROCEDURE — 25800000 HC PHARMACY IV SOLUTIONS: Performed by: ANESTHESIOLOGY

## 2022-08-17 PROCEDURE — 63600000 HC DRUGS/DETAIL CODE: Performed by: UROLOGY

## 2022-08-17 PROCEDURE — 37000001 HC ANESTHESIA GENERAL: Performed by: UROLOGY

## 2022-08-17 PROCEDURE — 25000000 HC PHARMACY GENERAL: Performed by: ANESTHESIOLOGY

## 2022-08-17 PROCEDURE — 63600000 HC DRUGS/DETAIL CODE: Performed by: ANESTHESIOLOGY

## 2022-08-17 PROCEDURE — 88307 TISSUE EXAM BY PATHOLOGIST: CPT | Performed by: UROLOGY

## 2022-08-17 PROCEDURE — 25800000 HC PHARMACY IV SOLUTIONS: Performed by: UROLOGY

## 2022-08-17 PROCEDURE — 27200000 HC STERILE SUPPLY: Performed by: UROLOGY

## 2022-08-17 PROCEDURE — 71000011 HC PACU PHASE 1 EA ADDL MIN: Performed by: UROLOGY

## 2022-08-17 RX ORDER — SODIUM CHLORIDE 9 MG/ML
INJECTION, SOLUTION INTRAVENOUS CONTINUOUS PRN
Status: DISCONTINUED | OUTPATIENT
Start: 2022-08-17 | End: 2022-08-17 | Stop reason: SURG

## 2022-08-17 RX ORDER — PROPOFOL 10 MG/ML
INJECTION, EMULSION INTRAVENOUS AS NEEDED
Status: DISCONTINUED | OUTPATIENT
Start: 2022-08-17 | End: 2022-08-17 | Stop reason: SURG

## 2022-08-17 RX ORDER — MIDAZOLAM HYDROCHLORIDE 2 MG/2ML
INJECTION, SOLUTION INTRAMUSCULAR; INTRAVENOUS AS NEEDED
Status: DISCONTINUED | OUTPATIENT
Start: 2022-08-17 | End: 2022-08-17 | Stop reason: SURG

## 2022-08-17 RX ORDER — HYDROMORPHONE HYDROCHLORIDE 1 MG/ML
0.5 INJECTION, SOLUTION INTRAMUSCULAR; INTRAVENOUS; SUBCUTANEOUS
Status: DISCONTINUED | OUTPATIENT
Start: 2022-08-17 | End: 2022-08-17 | Stop reason: HOSPADM

## 2022-08-17 RX ORDER — ROCURONIUM BROMIDE 10 MG/ML
INJECTION, SOLUTION INTRAVENOUS AS NEEDED
Status: DISCONTINUED | OUTPATIENT
Start: 2022-08-17 | End: 2022-08-17 | Stop reason: SURG

## 2022-08-17 RX ORDER — DIPHENHYDRAMINE HCL 50 MG/ML
12.5 VIAL (ML) INJECTION
Status: DISCONTINUED | OUTPATIENT
Start: 2022-08-17 | End: 2022-08-17 | Stop reason: HOSPADM

## 2022-08-17 RX ORDER — CEPHALEXIN 500 MG/1
500 CAPSULE ORAL 2 TIMES DAILY
Qty: 4 CAPSULE | Refills: 0 | Status: SHIPPED | OUTPATIENT
Start: 2022-08-18 | End: 2022-08-20

## 2022-08-17 RX ORDER — SODIUM CHLORIDE 9 MG/ML
INJECTION, SOLUTION INTRAVENOUS CONTINUOUS
Status: DISCONTINUED | OUTPATIENT
Start: 2022-08-17 | End: 2022-08-17 | Stop reason: HOSPADM

## 2022-08-17 RX ORDER — LIDOCAINE HYDROCHLORIDE 10 MG/ML
INJECTION, SOLUTION INFILTRATION; PERINEURAL AS NEEDED
Status: DISCONTINUED | OUTPATIENT
Start: 2022-08-17 | End: 2022-08-17 | Stop reason: SURG

## 2022-08-17 RX ORDER — FENTANYL CITRATE 50 UG/ML
INJECTION, SOLUTION INTRAMUSCULAR; INTRAVENOUS AS NEEDED
Status: DISCONTINUED | OUTPATIENT
Start: 2022-08-17 | End: 2022-08-17 | Stop reason: SURG

## 2022-08-17 RX ORDER — FENTANYL CITRATE 50 UG/ML
50 INJECTION, SOLUTION INTRAMUSCULAR; INTRAVENOUS
Status: DISCONTINUED | OUTPATIENT
Start: 2022-08-17 | End: 2022-08-17 | Stop reason: HOSPADM

## 2022-08-17 RX ORDER — ONDANSETRON HYDROCHLORIDE 2 MG/ML
INJECTION, SOLUTION INTRAVENOUS AS NEEDED
Status: DISCONTINUED | OUTPATIENT
Start: 2022-08-17 | End: 2022-08-17 | Stop reason: SURG

## 2022-08-17 RX ORDER — MEPERIDINE HYDROCHLORIDE 25 MG/ML
12.5 INJECTION INTRAMUSCULAR; INTRAVENOUS; SUBCUTANEOUS EVERY 10 MIN PRN
Status: DISCONTINUED | OUTPATIENT
Start: 2022-08-17 | End: 2022-08-17 | Stop reason: HOSPADM

## 2022-08-17 RX ORDER — ONDANSETRON HYDROCHLORIDE 2 MG/ML
4 INJECTION, SOLUTION INTRAVENOUS
Status: DISCONTINUED | OUTPATIENT
Start: 2022-08-17 | End: 2022-08-17 | Stop reason: HOSPADM

## 2022-08-17 RX ADMIN — CEFTRIAXONE SODIUM 1 G: 1 INJECTION, POWDER, FOR SOLUTION INTRAMUSCULAR; INTRAVENOUS at 06:36

## 2022-08-17 RX ADMIN — FENTANYL CITRATE 25 MCG: 50 INJECTION, SOLUTION INTRAMUSCULAR; INTRAVENOUS at 07:59

## 2022-08-17 RX ADMIN — LIDOCAINE HYDROCHLORIDE 5 ML: 10 INJECTION, SOLUTION INFILTRATION; PERINEURAL at 07:33

## 2022-08-17 RX ADMIN — ONDANSETRON 4 MG: 2 INJECTION INTRAMUSCULAR; INTRAVENOUS at 07:44

## 2022-08-17 RX ADMIN — SUCCINYLCHOLINE CHLORIDE 120 MG: 20 INJECTION, SOLUTION INTRAMUSCULAR; INTRAVENOUS at 07:34

## 2022-08-17 RX ADMIN — FENTANYL CITRATE 50 MCG: 50 INJECTION, SOLUTION INTRAMUSCULAR; INTRAVENOUS at 07:33

## 2022-08-17 RX ADMIN — ROCURONIUM BROMIDE 10 MG: 10 INJECTION, SOLUTION INTRAVENOUS at 07:33

## 2022-08-17 RX ADMIN — SODIUM CHLORIDE: 9 INJECTION, SOLUTION INTRAVENOUS at 07:28

## 2022-08-17 RX ADMIN — MIDAZOLAM HYDROCHLORIDE 1 MG: 1 INJECTION, SOLUTION INTRAMUSCULAR; INTRAVENOUS at 07:27

## 2022-08-17 RX ADMIN — MIDAZOLAM HYDROCHLORIDE 1 MG: 1 INJECTION, SOLUTION INTRAMUSCULAR; INTRAVENOUS at 07:29

## 2022-08-17 RX ADMIN — FENTANYL CITRATE 25 MCG: 50 INJECTION, SOLUTION INTRAMUSCULAR; INTRAVENOUS at 07:48

## 2022-08-17 RX ADMIN — PROPOFOL 200 MG: 10 INJECTION, EMULSION INTRAVENOUS at 07:33

## 2022-08-17 NOTE — ANESTHESIA PROCEDURE NOTES
Airway  Urgency: elective    Start Time: 8/17/2022 7:36 AM  Airway not difficult    General Information and Staff    Patient location during procedure: OR  Anesthesiologist: Chula Justice MD  Performed: anesthesiologist     Indications and Patient Condition  Indications for airway management: anesthesia  Sedation level: general  Preoxygenated: yes  Patient position: sniffing  Mask difficulty assessment: 1 - vent by mask    Final Airway Details  Final airway type: endotracheal airway      Successful airway: ETT  Cuffed: yes   Successful intubation technique: video laryngoscopy  Facilitating devices/methods: intubating stylet  Endotracheal tube insertion site: oral  Blade: Nina  Blade size: #4  ETT size (mm): 8.0  Cormack-Lehane Classification: grade I - full view of glottis  Placement verified by: chest auscultation and capnometry   Cuff volume (mL): 6  Measured from: lips  ETT to lips (cm): 24  Number of attempts at approach: 1  Number of other approaches attempted: 0  Atraumatic airway insertion

## 2022-08-17 NOTE — ANESTHESIA PREPROCEDURE EVALUATION
Relevant Problems   Other   (+) Type 2 diabetes mellitus without complication, without long-term current use of insulin (CMS/HCC)       Anesthesia ROS/MED HX    Anesthesia History - neg  Pulmonary - neg  Neuro/Psych - neg  Cardiovascular   dyslipidemia   Covid19 Test Reviewed and ECG reviewed  Comments: Multiple crowns, teeth discolored  Hematological - neg  GI/Hepatic- neg  Musculoskeletal- neg  Renal Disease- neg  Endo/Other   Diabetes  Body Habitus: Obese  ROS/MED HX Comments:    Renal Disease: Bladder tumor       Past Surgical History:   Procedure Laterality Date   • CARPAL TUNNEL RELEASE Bilateral        Physical Exam    Airway   Mallampati: III   TM distance: <3 FB   Neck ROM: full  Cardiovascular - normal   Rhythm: regular   Rate: normalPulmonary - normal   clear to auscultation  Other Findings   Multiple crowns, teeth discolored        Anesthesia Plan     Technique: general endotracheal     Lines and Monitors: PIV     Airway: video laryngoscope and oral intubation       patient did not smoke on day of surgery  ASA 2  Blood Products:   Use of Blood Products Discussed: No   Anesthetic plan and risks discussed with: patient and spouse  Induction:    intravenous   Parents Present: No  Postop Plan:   Patient Disposition: phase II then home   Pain Management: IV analgesics  COVID negative 08/12/2022, anastasiia-operative anti-emetics including scopolamine patch, reviewed labs, EKG, all questions answered.

## 2022-08-17 NOTE — OP NOTE
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    OPERATION:  Cystoscopy with distal urethral dilation, transurethral resection of left bladder wall tumor with fulguration and Castillo catheterization (22-Swazi silicone).    SURGEON:  Penny Bethea M.D.    INDICATIONS:  As above.  Risks, benefits and alternatives were discussed with the patient in detail.  The patient expressed clear understanding of all the issues and asked to proceed, with no further questions.    DESCRIPTION OF PROCEDURE:  After intravenous broad-spectrum antibiotic administration and satisfactory induction of general anesthesia, the patient was positioned in the dorsal lithotomy position and prepped and draped in standard fashion for cystoscopic   examination.  An attempt was made to pass a 21-Swazi cystoscope.  We encountered stenosis at the fossa navicularis which was dilated to 28-Swazi.  Cystoscope was advanced.  The patient has trilobar BPH.  There is a significant middle lobe.  Bladder   evaluation was remarkable for some trabeculation.  There were no foreign bodies.  Known tumor was seen on the left lateral wall.  It was hard to reach this site as the patient's prostate was big and he had a large bladder.  We resected using cold   forceps, both for safety's sake, to avoid bladder perforation, given the location and his anatomy, and to avoid cautery artifact of the tissue, as it was felt to be imperative to determine depth of invasion.  With the tissue having been completely   resected, the base and immediately surrounding area was fulgurated, and hemostasis was excellent.  Specimen was collected and processed, and the only residual bleeding was from the prostate, with some minor oozing.  A few small vessels  were cauterized,   which reduced the oozing from the prostate, and then a Castillo catheter was placed.  Effluent was fairly clear, and the patient was awakened and at that point and returned to the recovery room in stable condition.  There were no complications.      Penny Bethea MD    DD: 08/17/2022 10:02  DT: 08/17/2022 10:15  Voice ID: [id number]/Report ID: [id number]  ch

## 2022-08-17 NOTE — DISCHARGE INSTRUCTIONS
You have an appointment on Friday 8/19 at 9:45am for catheter removal.  Starting tomorrow, please take Keflex twice daily for 2 days.    Return to the nearest emergency department if you experience any of the following:  -Fever greater than 100.4  -Uncontrollable Pain  -Uncontrollable Nausea or Vomiting  -Uncontrollable bleeding  -Inability to urinate  -Anything else you feel needs Emergent medical attention

## 2022-08-17 NOTE — ANESTHESIOLOGIST PRE-PROCEDURE ATTESTATION
Pre-Procedure Patient Identification:  I am the Primary Anesthesiologist and have identified the patient on 08/17/22 at 7:05 AM.   I have confirmed the procedure(s) will be performed by the following surgeon/proceduralist Penny Bethea MD.

## 2022-08-17 NOTE — ANESTHESIA POSTPROCEDURE EVALUATION
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])    Surgeons: Penny Bethea MD Responsible Provider: Chula Justice MD    Anesthesia Type: general ASA Status: 2          Anesthesia Type: general  PACU Vitals  8/17/2022 0807 - 8/17/2022 0907      8/17/2022  0813 8/17/2022  0815 8/17/2022  0830 8/17/2022  0845    BP: 136/65 136/65 127/60 118/58    Temp: 36.3 °C (97.4 °F) -- -- --    Pulse: 60 60 54 52    Resp: 17 18 8 12    SpO2: 96 % -- -- --              8/17/2022  0900             BP: 151/70       Temp: 36.6 °C (97.8 °F)       Pulse: 53       Resp: 9       SpO2: --               Anesthesia Post Evaluation    Pain management: adequate  Patient location during evaluation: PACU  Patient participation: complete - patient participated  Level of consciousness: awake and alert  Cardiovascular status: acceptable  Airway Patency: adequate  Respiratory status: acceptable  Hydration status: acceptable  Anesthetic complications: no

## 2023-01-17 NOTE — NURSING NOTE
Dr. Maldonado into see pt. Pt denies cp sob. Wife present. Discharged to home. CM removed.   Pt lives alone in an apartment, does ALDs by herself and has family support

## 2023-10-13 ENCOUNTER — TRANSCRIBE ORDERS (OUTPATIENT)
Dept: SCHEDULING | Age: 73
End: 2023-10-13

## 2023-10-13 DIAGNOSIS — D40.0 NEOPLASM OF UNCERTAIN BEHAVIOR OF PROSTATE: Primary | ICD-10-CM

## 2023-11-08 ENCOUNTER — HOSPITAL ENCOUNTER (OUTPATIENT)
Dept: RADIOLOGY | Facility: CLINIC | Age: 73
Discharge: HOME | End: 2023-11-08
Attending: UROLOGY
Payer: MEDICARE

## 2023-11-08 DIAGNOSIS — D40.0 NEOPLASM OF UNCERTAIN BEHAVIOR OF PROSTATE: ICD-10-CM

## 2023-11-08 RX ORDER — GADOBUTROL 604.72 MG/ML
0.1 INJECTION INTRAVENOUS ONCE
Status: COMPLETED | OUTPATIENT
Start: 2023-11-08 | End: 2023-11-08

## 2023-11-08 RX ADMIN — GADOBUTROL 11 ML: 604.72 INJECTION INTRAVENOUS at 14:23

## 2023-12-05 ENCOUNTER — HOSPITAL ENCOUNTER (OUTPATIENT)
Dept: RADIOLOGY | Facility: CLINIC | Age: 73
Discharge: HOME | End: 2023-12-05
Attending: UROLOGY
Payer: MEDICARE

## 2023-12-05 DIAGNOSIS — R93.89 ABNORMAL FINDINGS ON DIAGNOSTIC IMAGING OF OTHER SPECIFIED BODY STRUCTURES: ICD-10-CM

## (undated) DEVICE — SOLN IRRIG STER WATER 500ML

## (undated) DEVICE — GOWN SURGICAL REINFORCED XX-LARGE

## (undated) DEVICE — ***USE 57698*** SLEEVE FLOWTRON DVT CALF SINGLE USE

## (undated) DEVICE — PACK RFID CYSTOSCOPY

## (undated) DEVICE — BAG URINARY DRAINAGE 4 LITER

## (undated) DEVICE — GLOVE SZ 8 PROTEXIS PI

## (undated) DEVICE — GOWN SURGICAL REINFORCED X-LAR

## (undated) DEVICE — HEEL  ELBOW PROTECTOR

## (undated) DEVICE — SOLN IRRIG .9%SOD 500ML

## (undated) DEVICE — Device

## (undated) DEVICE — PAD GROUND ELECTROSURGICAL W/CORD

## (undated) DEVICE — SORBITOL IN WATER 3%, 3000 MLUROMATIC CONTAINER

## (undated) DEVICE — CORD ENDO ACMI FOR BUGBEE 10 FT

## (undated) DEVICE — DRAINBAG URO TABLE W/12FT HOSE NON-STERILE

## (undated) DEVICE — BAG URINARY DRAINAGE